# Patient Record
Sex: FEMALE | Race: WHITE | NOT HISPANIC OR LATINO | Employment: FULL TIME | URBAN - METROPOLITAN AREA
[De-identification: names, ages, dates, MRNs, and addresses within clinical notes are randomized per-mention and may not be internally consistent; named-entity substitution may affect disease eponyms.]

---

## 2023-03-24 ENCOUNTER — TRANSFERRED RECORDS (OUTPATIENT)
Dept: HEALTH INFORMATION MANAGEMENT | Facility: CLINIC | Age: 33
End: 2023-03-24

## 2023-06-27 ENCOUNTER — TRANSCRIBE ORDERS (OUTPATIENT)
Dept: MATERNAL FETAL MEDICINE | Facility: CLINIC | Age: 33
End: 2023-06-27

## 2023-06-27 ENCOUNTER — TELEPHONE (OUTPATIENT)
Dept: MATERNAL FETAL MEDICINE | Facility: CLINIC | Age: 33
End: 2023-06-27

## 2023-06-27 ENCOUNTER — MEDICAL CORRESPONDENCE (OUTPATIENT)
Dept: HEALTH INFORMATION MANAGEMENT | Facility: CLINIC | Age: 33
End: 2023-06-27
Payer: COMMERCIAL

## 2023-06-27 DIAGNOSIS — O26.90 PREGNANCY RELATED CONDITION, ANTEPARTUM: Primary | ICD-10-CM

## 2023-06-27 NOTE — TELEPHONE ENCOUNTER
June 27, 2023      I called Obdulia to obtain additional information regarding her provider's referral to Maternal Fetal Medicine for preconception genetic counseling.    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 They determined at that time that it was paternally inherited. Her older sister sought genetic counseling prior to have children, which identified she too was a carrier of the balanced translocation.     I asked Obdulia if she is able to obtain the early records from her amniocentesis, granted they may be difficult to track down at this point. She said she will ask her mother, and if she does find them, email them to me. Either way, I asked that she bring a copy of her sister's genetic test report if possible.    We look forward to being part of Obdulia's care.      Mauricio Aquino GC, MS  [facility]  Maternal Fetal Medicine  Office: [phone]  [facility]: [phone]   Fax: [phone]  [facility]

## 2023-06-28 ENCOUNTER — TRANSCRIBE ORDERS (OUTPATIENT)
Dept: MATERNAL FETAL MEDICINE | Facility: CLINIC | Age: 33
End: 2023-06-28

## 2023-06-28 DIAGNOSIS — Z31.69 ENCOUNTER FOR PRECONCEPTION CONSULTATION: Primary | ICD-10-CM

## 2023-07-03 ENCOUNTER — DOCUMENTATION ONLY (OUTPATIENT)
Dept: MATERNAL FETAL MEDICINE | Facility: CLINIC | Age: 33
End: 2023-07-03
Payer: COMMERCIAL

## 2023-07-03 ENCOUNTER — TELEPHONE (OUTPATIENT)
Dept: MATERNAL FETAL MEDICINE | Facility: CLINIC | Age: 33
End: 2023-07-03
Payer: COMMERCIAL

## 2023-07-03 NOTE — TELEPHONE ENCOUNTER
July 3rd, 2023     Called Obdulia and left a voicemail asking for a return call about her upcoming appointment with a maternal fetal medicine genetic counselor scheduled for 7/6/2023.     JOSE NICOLE GC, MS  Genetic Counselor Intern  [facility]  Maternal Fetal Medicine  Office: [phone]   [facility]: [phone]   Fax: [phone]  [facility]

## 2023-07-03 NOTE — PROGRESS NOTES
7/3/2023    Called referring OB clinic to discuss Obdulia's referral to [facility] for preconception genetic counseling.  Their triage nurse reports that they have not received any documentation of genetic testing results for Obdulia, but that she did provide them copies of genetic testing results done on her sister's pregnancy.  Genetic counseling declined to review these records due to privacy concerns for Obdulia's sister and will contact Obdulia directly to discuss further.     Az Davalos MS, [facility]  Licensed Genetic Counselor  Phone: [phone]  Pager: [phone]

## 2023-07-06 ENCOUNTER — MEDICAL CORRESPONDENCE (OUTPATIENT)
Dept: MATERNAL FETAL MEDICINE | Facility: CLINIC | Age: 33
End: 2023-07-06

## 2023-07-06 ENCOUNTER — OFFICE VISIT (OUTPATIENT)
Dept: MATERNAL FETAL MEDICINE | Facility: CLINIC | Age: 33
End: 2023-07-06
Attending: OBSTETRICS & GYNECOLOGY
Payer: COMMERCIAL

## 2023-07-06 ENCOUNTER — LAB (OUTPATIENT)
Dept: LAB | Facility: CLINIC | Age: 33
End: 2023-07-06
Attending: OBSTETRICS & GYNECOLOGY
Payer: COMMERCIAL

## 2023-07-06 DIAGNOSIS — Z31.69 ENCOUNTER FOR PRECONCEPTION CONSULTATION: ICD-10-CM

## 2023-07-06 DIAGNOSIS — Z31.438 ENCOUNTER FOR OTHER GENETIC TESTING OF FEMALE FOR PROCREATIVE MANAGEMENT: ICD-10-CM

## 2023-07-06 DIAGNOSIS — Z82.79 FAMILY HISTORY OF AUTOSOMAL TRANSLOCATION: Primary | ICD-10-CM

## 2023-07-06 DIAGNOSIS — Z82.79 FAMILY HISTORY OF AUTOSOMAL TRANSLOCATION: ICD-10-CM

## 2023-07-06 PROCEDURE — 88291 CYTO/MOLECULAR REPORT: CPT | Performed by: MEDICAL GENETICS

## 2023-07-06 PROCEDURE — 88264 CHROMOSOME ANALYSIS 20-25: CPT

## 2023-07-06 PROCEDURE — 36415 COLL VENOUS BLD VENIPUNCTURE: CPT

## 2023-07-06 PROCEDURE — 96040 HC GENETIC COUNSELING, EACH 30 MINUTES: CPT | Performed by: GENETIC COUNSELOR, MS

## 2023-07-06 NOTE — PROGRESS NOTES
[facility] [place]  Genetic Counseling Consult    Patient:  Obdulia Miguel YOB: 1990   Date of Service:  23   MRN: [id number]    Obdulia was seen at the [facility] for genetic consultation. The indication for genetic counseling is personal and family history of balanced chromosome translocation. The patient was unaccompanied to this visit.     The session was conducted in English.      IMPRESSION/ PLAN   1. Obdulia was identified as having a balanced 2;7 translocation via amniocentesis done during her mother's pregnancy with her.  Unfortunately records/results of this testing are no longer available.  Obdulia had blood drawn for chromosome analysis to confirm this status.  Results are expected in 2-3 weeks and will be available in Epic. Obdulia will be contacted to discuss results and requests a detailed message be left in her voicemail if she cannot be reached.    2. Obdulia also had blood drawn for carrier screening (Voxeet custom core panel, CF, SMA, FMR1, HBB and HBA).  Results are expected in 2 weeks and will be available in Epic and communicated to the patient. Obdulia completed a consent to communicate with her partner Darryl in the event carrier screening is needed for him.          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  This diagnosis was made via amniocentesis when her mother was pregnant with her.  Odbulia has a significant family history of the translocation as well. Unfortunately, records or results from her own diagnostic testing are no longer available.  Obdulia reports that diagnostic test results from her sister's own pregnancies are available for review, however we discussed that it would be better for her to have her own  diagnosis confirmed via chromosome analysis for herself. Obdulia completed consent for this testing today and will be contacted with results when available.    The majority of today's appointment was spent discussing Obdulia's personal and family history of a balanced chromosome translocation, reportedly involving chromosomes 2 and 7. Chromosomes are the packages of genetic information that we inherit from our parents, with each parent contributing 23 chromosomes for a total of 46.  It is possible for chromosomes to exchange or rearrange parts of themselves, resulting in what is called a translocation.  When an individual has a translocation that retains all of the necessary pieces of DNA, just in different locations, this is called a balanced translocation. For example, if an individual has a balanced translocation between chromosomes 2 and 7, they have 1 normal copy of chromosome 2, 1 normal copy of chromosome 7, a copy of 2 that has swapped pieces with chromosome 7, and the chromosome 7 that has that piece of 2. Balanced translocations typically do not cause any detectable health problems in the individual, but can be a cause for difficult reproductive outcomes such as recurrent miscarriage.     When an individual's body produces eggs or sperm, it splits the number of chromosome present in a cell in half, so that they only passes on half of their genetic information to a pregnancy, with the other half coming from the egg/sperm produced by their partner.  During this process, called segregation, there are number of possible outcomes for individuals with a balanced translocation:     1.  An egg/sperm receives the normal copy of each chromosome, and there are no complications expected.        2.  An egg/sperm receives both chromosomes involved in the translocation.  This egg/sperm would have the expected amount of chromosome material, just rearranged.  A pregnancy conceived in this scenario would be a balanced  translocation carrier, the same as the parent.       3.  An egg receives an unbalanced amount of chromosome material, receiving the normal copy of  one chromosome pair, and a translocation copy.  For our example an individual could receive the normal copy of chromosome 2, and the copy of chromosome 7 that is missing part of 7 and has a piece of chromosome 2 attached to it.  This results in a net loss in part of chromosome 7 and a net gain in part of chromosome 2.  Other unbalanced segregations are possible as well.       Extra or missing pieces of DNA are associated with a wide range of outcomes, including miscarriage, still birth, birth defects, and cognitive impairment.  The specific extra or missing portions of DNA play a role in determining the severity of outcome.  In general, the larger the amount of DNA involved, the more severe the outcome (the larger the loss or gain of DNA, the more likely a pregnancy is to miscarry instead of carrying to term and having health concerns after delivery).  The chances for each configuration of chromosomes in an egg depends on the specific rearrangements present. Obdulia's family history is negative for individuals with birth defects or intellectual disabilities, but multiple individuals with the translocation or their partners have experienced multiple miscarriages.    We discussed that there is a physician who specializes in working with families who have known balanced translocations that provides specific risk estimates for miscarriage and live born pregnancies with an unbalanced chromosome complement for these couples based on their specific translocation. Obdulia would like this analysis done once her chromosome analysis results are back.     Obdulia is considering beginning trying for pregnancy with her , and we discussed that based on her reported translocation status her pregnancies may be at higher risk for miscarriage, birth defects, and developmental  "impairments/disabiliites.  We discussed that other options explored by some individuals with translocations include IVF pregnancies with preimplantation genetic screening, using sperm/egg donors, or adoption.  In any pregnancy diagnostic testing via CVS would e available as early as 11 weeks gestation, and we discussed that in certain circumstances there is also non-invasive prenatal testing for large chromosome changes outside the typical scope of NIPT.  Early genetic counseling in any future pregnancy could be beneficial for Obdulia to ensure an appropriate testing strategy based on her preferences.     FAMILY HISTORY   A three-generation pedigree was obtained today and is scanned under the \"Media\" tab in Epic. The family history was reported by Obdulia.    The following significant findings were reported today:     Obdulia, her sister, her father, a paternal uncle, and a paternal first cousin are all reportedly carriers of the same balanced translocation.  See discussion above under medical history.    Obdulia's father had colorectal cancer at age 52.  We discussed that he may be a candidate for genetic testing for hereditary cancer risks, and that a genetic counseling appointment focused on his history may be beneficial for him and the family.  Regardless the family history should be taken into account when planning cancer screening for Obdulia and her sister.     Otherwise, the reported family history is unremarkable for multiple miscarriages, stillbirths, birth defects, intellectual disabilities, known genetic conditions, and consanguinity.       CARRIER SCREENING   Expanded carrier screening is available to screen for autosomal recessive conditions and X-linked conditions in a large list of genes. Autosomal recessive conditions happen when a mutation has been inherited from the egg and sperm and include conditions like cystic fibrosis, thalassemia, hearing loss, spinal muscular atrophy, and more. X-linked " conditions happen when a mutation has been inherited from the egg and include conditions like fragile X syndrome.  screening was also reviewed. About [place] [facility]      We discussed carrier screening can be done before or during apregnancy. The purpose of carrier screening is providing an individual or couple with a personalized risk assessment for genetic conditions. This is accomplished by screening an individual to determine if they are a carrierfor a genetic condition. For most conditions, both parents must be a carrier of the condition for the pregnancy to be at an increased risk. For other conditions that are X-linked, there is an increased risk when a female(mother) is a carrier and the concerns are greater if she passes that condition to a son.     Carrier screening is an option and a personal decision to pursue. If an individual or couple is interested or wishes to pursue carrier screening the following is discussed:    For most genetic conditions, carriers are healthy individuals. For autosomal recessive conditions (ex cystic fibrosis, sicklecell anemia, etc), individuals have two copies of each gene. Carrier individuals have a mutation in one copy. For X-linked conditions, females have two copies of each gene and are considered carriers if one copy has amutation. Males only have one copy of an X-linked gene, therefore, if that one copy has a mutation they are affected by the condition, rather than only being a carrier      For select conditions, carriers can have symptoms, however, the chance is variable. Examples of these conditions include:  o Femalepremutation carriers of fragile X syndrome can have symptoms in adulthood including premature ovarian failure and ataxia. If a female passes the mutation to a son they are at a high risk for fragile X syndrome, which is themost common genetic cause for autism spectrum disorder  o Female or male carriers of Gaucher disease can have an increased  chance for developing Parkinson's disease. Gaucher disease is a severe metabolic disorder.       If both parents are carriers of an autosomal recessive condition, there are three possible outcomes for each pregnancy/child: 25% unaffected, 50% carrier, and 25%affected. The only method to determine the outcome or diagnosis the condition in a pregnancy is an invasive testing option such as an amniocentesis. Some genetic conditions will have ultrasound findings during the pregnancybut many do not. Some couples will choose the diagnostic testing to plan for delivery or choose termination of an affected pregnancy. Other couples will choose to test for the condition after delivery. While these conditionscannot be cured or treated during the pregnancy it may guide management recommendations (ultrasounds) for pregnancy as well as delivery and infant care.     Obdulia wished to pursue carrier screening. The following was discussed as part of informed consent in addition to the above information:    We discussed [employer] carrier screening which is offered with several different panels. Obdulia chose the CORE panel which includes CF, SMA, FMR1, HBB, and HBA.       Carrier screening is not meant to diagnose the patient with a condition, and generally carriers are asymptomatic. However, certain genes may confer increased risks for various health concerns in carriers such as fragile X syndrome, Duchene muscular dystrophy, and Gaucher disease among others.       We discussed that expanded carrier screening is designed to identify carrier status for conditions that are primarily childhood or adolescent onset. Expanded carrier screening does not evaluate for adult-onset conditions such as hereditary cancer syndromes, dementia/ Alzheimer's disease, or cardiovascular disease risk factors. Additionally, expanded carrier screening is not comprehensive for all known genetic diseases or inherited conditions. This is a screening test, and  residual carrier status risk figures will be provided to the patient after results become available.  We discussed there are limitations such as current technology and rare chance of a false positive or negative.       [email or web address] laboratory will report on pseudodeficiency alleles, which are benign variants that are not known to be associated with disease and are not thought to impact the individuals risk to be a carrier for these conditions. However, the presence of pseudodeficiency alleles can exhibit false positive results on biochemical. Therefore, disclosing this information to patients may aid in interpretation of a  screen flag.       Results are typically available in 10-21 days. We will call her with the results and the report will eventually be available via [email or web address]'s patient portal.       Recommendations will be made for partner testing, if the patient is found to be a carrier for any autosomal recessive conditions. MFM will facilitate in screening for the partner.       There are implications for family members. If an individual is a carrier of a condition there is a chance for relatives to also be a carrier. This may be helpful information to disclose to family (siblings, cousins) so they may choose if they want to pursue carriers screening. In addition, if only one parent is found to be a carrier, there is a 50% chance for each child to be a carrier. This may be helpful information for the patient's children when they start a family.      [email or web address] will contact the patient via text, email, or both with cost estimate information. The communication will list the cost billed through insurance and provide an option for self-pay. The default is insurance billing so patients must choose the self pay option and follow through with credit card information if desired. The self pay cost of NIPT is $99, carrier screening is $250 with partner carrier screening for $100. The patient has the responsibility to  determine if insurance or self pay is a better financial decision.           GENETIC TESTING OPTIONS   Genetic testing during a pregnancy includes screening and diagnostic procedures.      Screening tests are non-invasive which means no risk to the pregnancy and includes ultrasounds and blood work. The benefits and limitations of screening were reviewed. Screening tests provide a risk assessment (chance) specific to the pregnancy for certain fetal chromosome abnormalities but cannot definitively diagnose or exclude a fetal chromosome abnormality. Follow-up genetic counseling and consideration of diagnostic testing is recommended with any abnormal screening result. Diagnostic testing during a pregnancy is more certain and can test for more conditions. However, the tests do have a risk of miscarriage that requires careful consideration. These tests can detect fetal chromosome abnormalities with greater than 99% certainty. Results can be compromised by maternal cell contamination or mosaicism and are limited by the resolution of current genetic testing technology.     There is no screening or diagnostic test that detects all forms of birth defects or intellectual disability.     We discussed the following screening options:   Non-invasive prenatal testing (NIPT)    Also called cell-free DNA screening because it detects chromosomes from the placenta in the pregnant person's blood    Can be done any time after 10 weeks gestation    Screens for trisomy 21, trisomy 18, trisomy 13, and sex chromosome aneuploidies    Cannot screen for open neural tube defects, maternal serum AFP after 15 weeks is recommended    We discussed the following diagnostic options:   Chorionic villus sampling (CVS)    Invasive diagnostic procedure done between 10w0d and 13w6d    The procedure collects a small sample from the placenta for the purpose of chromosomal testing and/or other genetic testing    Diagnostic result; more than 99% sensitivity  for fetal chromosome abnormalities    Cannot screen for open neural tube defects, maternal serum AFP after 15 weeks is recommended    Amniocentesis    Invasive diagnostic procedure done after 15 weeks gestation    The procedure collects a small sample of amniotic fluid for the purpose of chromosomal testing and/or other genetic testing    Diagnostic result; more than 99% sensitivity for fetal chromosome abnormalities    Testing for AFP in the amniotic fluid can test for open neural tube defects        It was a pleasure to be involved with Obdulia s care. Face-to-face time of the meeting was 60 minutes.    Az Davalos GC, MS, [facility]  Certified and [place] Licensed Genetic Counselor  [facility]  Maternal Fetal Medicine  Office: [phone]  [facility]: [phone]   Fax: [phone]  [facility]

## 2023-07-17 ENCOUNTER — TELEPHONE (OUTPATIENT)
Dept: MATERNAL FETAL MEDICINE | Facility: CLINIC | Age: 33
End: 2023-07-17
Payer: COMMERCIAL

## 2023-07-17 LAB — SCANNED LAB RESULT: NORMAL

## 2023-07-17 NOTE — TELEPHONE ENCOUNTER
7/17/2023    Called Obdulia to discuss carrier screening results.  Her results are negative for all 6 genes assessed.  Reproductive risks are low for all conditions.  Obdulia had no questions regarding this result and confirmed that her chromosome analysis is still pending, with results expected in the next 1-2 weeks.      Az Davalos MS, [facility]  Licensed Genetic Counselor  Phone: [phone]  Pager: [phone]

## 2023-07-21 LAB
CULTURE HARVEST COMPLETE DATE: NORMAL
INTERPRETATION: NORMAL
ISCN: NORMAL
METHODS: NORMAL

## 2023-07-24 ENCOUNTER — TELEPHONE (OUTPATIENT)
Dept: MATERNAL FETAL MEDICINE | Facility: CLINIC | Age: 33
End: 2023-07-24
Payer: COMMERCIAL

## 2023-07-24 NOTE — TELEPHONE ENCOUNTER
7/24/2023    Called .Obdulia to discuss results of her chromosome analysis.  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  Previously we had discussed submitting Obdulia's results to a physician in [place] that specializes in pregnancy risk assessments for parents with balanced reciprocal translocations and this request will be submitted now that Obdulia's results are back.  Genetic counseling will contact Obdulia to discuss this additional information as soon as it is available. This information was left in Obdulia's voicemail as requested at her genetic counseling appointment and she was encouraged to contact me to discuss further.       Az Davalos MS, [facility]  Licensed Genetic Counselor  Phone: [phone]  Pager: [phone]

## 2023-08-16 ENCOUNTER — TELEPHONE (OUTPATIENT)
Dept: MATERNAL FETAL MEDICINE | Facility: CLINIC | Age: 33
End: 2023-08-16
Payer: COMMERCIAL

## 2023-08-16 NOTE — TELEPHONE ENCOUNTER
8/16/2023    Called Obdulia to follow up on her previously disclosed balanced chromosome translocation results.  Further analysis using the protocol published by Dr. Banda al (citation below) provided the following risk assessments based on her specific translocation:    Risk for miscarriage: 37% (This estimate includes a baseline risk of miscarriage of 12% for pregnancy for a woman under 35)  Risk for liveborn pregnancy with unbalanced chromosomes: 1-4%    We discussed that a liveborn pregnancy with unbalanced chromosomes would be expected to have some physical and/or developmental disabilities, but predicting the type and severity is not possible with current information. Obdulia demonstrated a strong understanding of this new information and had no questions regarding this information.  She reports that these risks numbers are similar to what her sister remembers learning from her genetic counselor as well, and she feels some comfort in knowing the most likely outcome from any conception would be a liveborn pregnancy with balanced chromosomes.      Obdulia reports that she is newly pregnant based on a positive home pregnancy test, and is scheduled for a new OB intake visit and viability check on September 8th.  We discussed that non-invasive prenatal screening for large chromosome copy number changes such as unbalanced translocations is available at ~10 weeks gestation with a test called MaterniTGenome.  This is different screening than the standard NIPT offered by most OBs for common aneuploidy such as Down syndrome.  We discussed that some primary OB providers would be comfortable/able to facilitate this themselves, but referral to genetic counseling to discuss and coordinate testing is always available if necessary. Obdulia plans to discuss with her OB provider and will pursue a referral to M if needed.  We discussed that NIPT, including the maternitgenome testing is non-diagnostic, and briefly reviewed  the options for CVS and amniocentesis as well.  All of Obdulia's questions were answered to her satisfaction and she was encouraged to contact me directly as needed moving forward.     INOCENTE Mcmillan., [place]ell, JASE MCCANN., & LEE Ardon. (2022). Reproductive risk estimation calculator for balanced translocation carriers. Current Protocols, 2, e633. doi: 10.1002/cpz1.633    Az Davalos MS, [facility]  Licensed Genetic Counselor  Phone: [phone]  Pager: [phone]

## 2023-08-26 ENCOUNTER — HEALTH MAINTENANCE LETTER (OUTPATIENT)
Age: 33
End: 2023-08-26

## 2023-09-08 ENCOUNTER — LAB REQUISITION (OUTPATIENT)
Dept: LAB | Facility: CLINIC | Age: 33
End: 2023-09-08
Payer: COMMERCIAL

## 2023-09-08 ENCOUNTER — TRANSCRIBE ORDERS (OUTPATIENT)
Dept: MATERNAL FETAL MEDICINE | Facility: CLINIC | Age: 33
End: 2023-09-08

## 2023-09-08 ENCOUNTER — TRANSFERRED RECORDS (OUTPATIENT)
Dept: HEALTH INFORMATION MANAGEMENT | Facility: CLINIC | Age: 33
End: 2023-09-08

## 2023-09-08 ENCOUNTER — LAB REQUISITION (OUTPATIENT)
Dept: LAB | Facility: CLINIC | Age: 33
End: 2023-09-08

## 2023-09-08 DIAGNOSIS — Z34.90 ENCOUNTER FOR SUPERVISION OF NORMAL PREGNANCY, UNSPECIFIED, UNSPECIFIED TRIMESTER: ICD-10-CM

## 2023-09-08 DIAGNOSIS — O26.90 PREGNANCY RELATED CONDITION, ANTEPARTUM: Primary | ICD-10-CM

## 2023-09-08 LAB
BASOPHILS # BLD AUTO: 0 10E3/UL (ref 0–0.2)
BASOPHILS NFR BLD AUTO: 0 %
EOSINOPHIL # BLD AUTO: 0 10E3/UL (ref 0–0.7)
EOSINOPHIL NFR BLD AUTO: 0 %
ERYTHROCYTE [DISTWIDTH] IN BLOOD BY AUTOMATED COUNT: 12.1 % (ref 10–15)
HBV SURFACE AG SERPL QL IA: NONREACTIVE
HCT VFR BLD AUTO: 40.4 % (ref 35–47)
HCV AB SERPL QL IA: NONREACTIVE
HGB BLD-MCNC: 13.8 G/DL (ref 11.7–15.7)
HOLD SPECIMEN: NORMAL
IMM GRANULOCYTES # BLD: 0 10E3/UL
IMM GRANULOCYTES NFR BLD: 0 %
LYMPHOCYTES # BLD AUTO: 1.6 10E3/UL (ref 0.8–5.3)
LYMPHOCYTES NFR BLD AUTO: 15 %
MCH RBC QN AUTO: 29.3 PG (ref 26.5–33)
MCHC RBC AUTO-ENTMCNC: 34.2 G/DL (ref 31.5–36.5)
MCV RBC AUTO: 86 FL (ref 78–100)
MONOCYTES # BLD AUTO: 0.7 10E3/UL (ref 0–1.3)
MONOCYTES NFR BLD AUTO: 6 %
NEUTROPHILS # BLD AUTO: 8.7 10E3/UL (ref 1.6–8.3)
NEUTROPHILS NFR BLD AUTO: 79 %
NRBC # BLD AUTO: 0 10E3/UL
NRBC BLD AUTO-RTO: 0 /100
PLATELET # BLD AUTO: 286 10E3/UL (ref 150–450)
RBC # BLD AUTO: 4.71 10E6/UL (ref 3.8–5.2)
WBC # BLD AUTO: 11.1 10E3/UL (ref 4–11)

## 2023-09-08 PROCEDURE — 86803 HEPATITIS C AB TEST: CPT | Performed by: OBSTETRICS & GYNECOLOGY

## 2023-09-08 PROCEDURE — 86592 SYPHILIS TEST NON-TREP QUAL: CPT | Performed by: OBSTETRICS & GYNECOLOGY

## 2023-09-08 PROCEDURE — 87591 N.GONORRHOEAE DNA AMP PROB: CPT | Performed by: OBSTETRICS & GYNECOLOGY

## 2023-09-08 PROCEDURE — 86762 RUBELLA ANTIBODY: CPT | Performed by: OBSTETRICS & GYNECOLOGY

## 2023-09-08 PROCEDURE — 87340 HEPATITIS B SURFACE AG IA: CPT | Performed by: OBSTETRICS & GYNECOLOGY

## 2023-09-08 PROCEDURE — 87389 HIV-1 AG W/HIV-1&-2 AB AG IA: CPT | Performed by: OBSTETRICS & GYNECOLOGY

## 2023-09-08 PROCEDURE — 87491 CHLMYD TRACH DNA AMP PROBE: CPT | Performed by: OBSTETRICS & GYNECOLOGY

## 2023-09-08 PROCEDURE — 85025 COMPLETE CBC W/AUTO DIFF WBC: CPT | Performed by: OBSTETRICS & GYNECOLOGY

## 2023-09-08 PROCEDURE — 87086 URINE CULTURE/COLONY COUNT: CPT | Performed by: OBSTETRICS & GYNECOLOGY

## 2023-09-09 LAB
C TRACH DNA SPEC QL PROBE+SIG AMP: NEGATIVE
HIV 1+2 AB+HIV1 P24 AG SERPL QL IA: NONREACTIVE
N GONORRHOEA DNA SPEC QL NAA+PROBE: NEGATIVE

## 2023-09-10 LAB — BACTERIA UR CULT: NORMAL

## 2023-09-11 LAB
RPR SER QL: NONREACTIVE
RUBV IGG SERPL QL IA: 2.57 INDEX
RUBV IGG SERPL QL IA: POSITIVE

## 2023-09-25 ENCOUNTER — OFFICE VISIT (OUTPATIENT)
Dept: MATERNAL FETAL MEDICINE | Facility: CLINIC | Age: 33
End: 2023-09-25
Attending: OBSTETRICS & GYNECOLOGY
Payer: COMMERCIAL

## 2023-09-25 ENCOUNTER — LAB (OUTPATIENT)
Dept: LAB | Facility: CLINIC | Age: 33
End: 2023-09-25
Attending: OBSTETRICS & GYNECOLOGY
Payer: COMMERCIAL

## 2023-09-25 ENCOUNTER — MEDICAL CORRESPONDENCE (OUTPATIENT)
Dept: HEALTH INFORMATION MANAGEMENT | Facility: CLINIC | Age: 33
End: 2023-09-25

## 2023-09-25 ENCOUNTER — TRANSFERRED RECORDS (OUTPATIENT)
Dept: HEALTH INFORMATION MANAGEMENT | Facility: CLINIC | Age: 33
End: 2023-09-25

## 2023-09-25 DIAGNOSIS — Q99.8 CHROMOSOMAL TRANSLOCATION: Primary | ICD-10-CM

## 2023-09-25 DIAGNOSIS — Z31.5 ENCOUNTER FOR PROCREATIVE GENETIC COUNSELING AND TESTING: ICD-10-CM

## 2023-09-25 DIAGNOSIS — Z36.9 UNSPECIFIED ANTENATAL SCREENING: ICD-10-CM

## 2023-09-25 DIAGNOSIS — O26.90 PREGNANCY RELATED CONDITION, ANTEPARTUM: ICD-10-CM

## 2023-09-25 DIAGNOSIS — Q99.8 CHROMOSOMAL TRANSLOCATION: ICD-10-CM

## 2023-09-25 PROCEDURE — 36415 COLL VENOUS BLD VENIPUNCTURE: CPT

## 2023-09-25 PROCEDURE — 96040 HC GENETIC COUNSELING, EACH 30 MINUTES: CPT | Performed by: GENETIC COUNSELOR, MS

## 2023-09-25 NOTE — PROGRESS NOTES
[facility]aternal [facility]etal [facility]  Genetic Counseling Consult    Patient:  Obdulia Crum YOB: 1990   Date of Service:  23   MRN: [id number]    Obdulia was seen at the [facility] [place] for genetic consultation. The indication for genetic counseling is desire to discuss options for genetic screening and diagnostics. The patient was unaccompanied to this visit.     The session was conducted in English.      IMPRESSION/ PLAN   1. Obdulia had NIPT drawn for routine aneuploidy screening through her primary OB provider last week.  After reviewing the options for NIPT for other chromosome imbalances such as the unbalanced rearrangements from her translocation, Obdulia opted to proceed with additional NIPT today.  Obdulia had blood drawn for MaterniTGenome through [employer].  Results are expected in 7-14 days and will be available in [employer].  Obdulia requests detailed results, excluding predicted fetal sex, be left in her voicemail if she cannot be reached, and understands results including predicted fetal sex will be available in [employer] as well.     2. Further recommendations include a level II ultrasound with MFM and maternal serum AFP only screening for neural tube defects, if desired (quad screen should NOT be performed).     3. 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  This was briefly reviewed today and Obdulia reports that she would decline CVS, but might consider amniocentesis in a continuing pregnancy.  She likely would not make a final decision regarding amnio until after a level II anatomy scan with MFM.      PREGNANCY HISTORY   /Parity: G1      CURRENT PREGNANCY   Current Age: 32 year old   Age at Delivery: 32 year old  EUGENIA: Not found.                                   Gestational Age: Unknown  This pregnancy is a single  gestation.   This pregnancy was conceived spontaneously.    MEDICAL HISTORY   Obdulia s reported medical history is not expected to impact pregnancy management or risks to fetal development.       FAMILY HISTORY   A three-generation pedigree was obtained previously by a genetic counselor on 7/6/23 please see corresponding documentation for details.       CARRIER SCREENING     Obdulia had carrier screening done as a part of her preconception genetic counseling care earlier this year and this topic was not reviewed today.        RISK ASSESSMENT FOR CHROMOSOME CONDITIONS   We explained that the risk for fetal chromosome abnormalities increases with maternal age. We discussed specific features of common chromosome abnormalities, including Down syndrome, trisomy 13, trisomy 18, and sex chromosome trisomies.    At age 33 at midtrimester, the risk to have a baby with Down syndrome is 1 in 508.   At age 33 at midtrimester, the risk to have a baby with any chromosome abnormality is 1 in 254.     Obdulia has NIPT for common aneuploidies in process through her primary OB and after out discussion of her 2;7 translocation today opted to proceed with materniT genome NIPT.       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 Banda al (citation below) provided the following risk assessments based on her specific translocation:     Risk for miscarriage: 37% (This estimate includes a baseline risk of miscarriage of 12% for pregnancy for a woman under 35)  Risk for liveborn pregnancy with unbalanced chromosomes: 1-4%    BETHEL Mcmillan, Mendell, N. R., & Duglas, S. L. P. (2022). Reproductive risk estimation calculator for balanced translocation carriers. Current Protocols, 2, e633. doi: 10.1002/cpz1.633       We discussed the benefits and limitations of genome-wide cfDNA via MaterniT Genome via [employer]. This screen analyzes  cell free fetal DNA for chromosome imbalances greater than 7 Mb. It will screen for whole chromosome imbalances (trisomies and monosomies) as well as duplications and deletions in this range. Further, it will screen for seven smaller, clinically relevant deletion syndrome (22q11.2, 15q11, 11q23, 8q24, 5p15, 4p16, and 1p36). [employer] reports an estimated genome-wide sensitivity of up to 96%, though fetal fraction and event size can significantly impact the sensitivity. Negative screening reduces but does not eliminate the risk for the conditions screened and in no way replaces fetal microarray. Following a positive screen, evaluation by ultrasound and diagnostic testing is recommended. After reviewing the benefits and limitations of MaterniTGenome, as well as the expected insurance/cost concerns with performing NIPT twice in one pregnancy, Obdulia opted to proceed with screening today.    GENETIC TESTING OPTIONS   Genetic testing during a pregnancy includes screening and diagnostic procedures.      Screening tests are non-invasive which means no risk to the pregnancy and includes ultrasounds and blood work. The benefits and limitations of screening were reviewed. Screening tests provide a risk assessment (chance) specific to the pregnancy for certain fetal chromosome abnormalities but cannot definitively diagnose or exclude a fetal chromosome abnormality. Follow-up genetic counseling and consideration of diagnostic testing is recommended with any abnormal screening result. Diagnostic testing during a pregnancy is more certain and can test for more conditions. However, the tests do have a risk of miscarriage that requires careful consideration. These tests can detect fetal chromosome abnormalities with greater than 99% certainty. Results can be compromised by maternal cell contamination or mosaicism and are limited by the resolution of current genetic testing technology.     There is no screening or diagnostic test  that detects all forms of birth defects or intellectual disability.     We discussed the following screening options:   Non-invasive prenatal testing (NIPT)  Also called cell-free DNA screening because it detects chromosomes from the placenta in the pregnant person's blood  Can be done any time after 10 weeks gestation  Screens for trisomy 21, trisomy 18, trisomy 13, and sex chromosome aneuploidies  Cannot screen for open neural tube defects, maternal serum AFP after 15 weeks is recommended    We discussed the following ultrasound options:  Comprehensive level II ultrasound (Fetal Anatomy Ultrasound)  Ultrasound done between 18-20 weeks gestation  Screens for major birth defects and markers for aneuploidy (like trisomy 21 and trisomy 18)  Includes looking at the fetus/baby's growth, heart, organs (stomach, kidneys), placenta, and amniotic fluid    We discussed the following diagnostic options:   Chorionic villus sampling (CVS)  Invasive diagnostic procedure done between 10w0d and 13w6d  The procedure collects a small sample from the placenta for the purpose of chromosomal testing and/or other genetic testing  Diagnostic result; more than 99% sensitivity for fetal chromosome abnormalities  Cannot screen for open neural tube defects, maternal serum AFP after 15 weeks is recommended  Amniocentesis  Invasive diagnostic procedure done after 15 weeks gestation  The procedure collects a small sample of amniotic fluid for the purpose of chromosomal testing and/or other genetic testing  Diagnostic result; more than 99% sensitivity for fetal chromosome abnormalities  Testing for AFP in the amniotic fluid can test for open neural tube defects      It was a pleasure to be involved with [facility]. Face-to-face time of the meeting was 30 minutes.    Az Davalos, GC, MS, [facility]  Board Certified and [place] Licensed Genetic Counselor   [facility]  Maternal Fetal Medicine  Office: [phone]  MFM:  [phone]   Fax: [phone]  [facility]

## 2023-10-03 ENCOUNTER — TELEPHONE (OUTPATIENT)
Dept: MATERNAL FETAL MEDICINE | Facility: CLINIC | Age: 33
End: 2023-10-03
Payer: COMMERCIAL

## 2023-10-03 LAB — SCANNED LAB RESULT: NORMAL

## 2023-10-03 NOTE — TELEPHONE ENCOUNTER
10/3/2023       Called Obdulia to discuss NIPT results.  Results came back negative for chromosome abnormalities in chromosomes 21, 18, & 13, as well as the sex chromosomes. Genome-wide large copy number aneuploidy screening is also negative  These test results do not definitively rule out the possibility of one of these conditions, but they do greatly reduce the likelihood for the pregnancy to have a common trisomy or the unbalance translocation outcomes involving chromosomes 2 and 7 possible for this pregnancy.   Test results included predicted fetal sex and this information is available if desired at a later time. This information was left in Obdulia's voicemmail as requested and Obdulia was encouraged to reach out if she has any questions or concerns in the future.       Az Davalos MS, [facility]  Licensed Genetic Counselor  Phone: [phone]  Pager: [phone]

## 2023-10-16 ENCOUNTER — LAB REQUISITION (OUTPATIENT)
Dept: LAB | Facility: CLINIC | Age: 33
End: 2023-10-16
Payer: COMMERCIAL

## 2023-10-16 ENCOUNTER — TRANSFERRED RECORDS (OUTPATIENT)
Dept: HEALTH INFORMATION MANAGEMENT | Facility: CLINIC | Age: 33
End: 2023-10-16

## 2023-10-16 ENCOUNTER — TRANSCRIBE ORDERS (OUTPATIENT)
Dept: MATERNAL FETAL MEDICINE | Facility: CLINIC | Age: 33
End: 2023-10-16
Payer: COMMERCIAL

## 2023-10-16 ENCOUNTER — MEDICAL CORRESPONDENCE (OUTPATIENT)
Dept: HEALTH INFORMATION MANAGEMENT | Facility: CLINIC | Age: 33
End: 2023-10-16

## 2023-10-16 DIAGNOSIS — Z3A.14 14 WEEKS GESTATION OF PREGNANCY: ICD-10-CM

## 2023-10-16 DIAGNOSIS — O26.90 PREGNANCY RELATED CONDITION, ANTEPARTUM: Primary | ICD-10-CM

## 2023-10-16 LAB
ABO/RH(D): NORMAL
ANTIBODY SCREEN: NEGATIVE
SPECIMEN EXPIRATION DATE: NORMAL
SPECIMEN EXPIRATION DATE: NORMAL

## 2023-10-16 PROCEDURE — 86901 BLOOD TYPING SEROLOGIC RH(D): CPT | Performed by: OBSTETRICS & GYNECOLOGY

## 2023-10-16 PROCEDURE — 87086 URINE CULTURE/COLONY COUNT: CPT | Performed by: OBSTETRICS & GYNECOLOGY

## 2023-10-16 PROCEDURE — 86850 RBC ANTIBODY SCREEN: CPT | Performed by: OBSTETRICS & GYNECOLOGY

## 2023-10-18 LAB — BACTERIA UR CULT: NORMAL

## 2023-11-06 ENCOUNTER — PRE VISIT (OUTPATIENT)
Dept: MATERNAL FETAL MEDICINE | Facility: CLINIC | Age: 33
End: 2023-11-06
Payer: COMMERCIAL

## 2023-11-13 ENCOUNTER — OFFICE VISIT (OUTPATIENT)
Dept: MATERNAL FETAL MEDICINE | Facility: CLINIC | Age: 33
End: 2023-11-13
Attending: OBSTETRICS & GYNECOLOGY
Payer: COMMERCIAL

## 2023-11-13 ENCOUNTER — HOSPITAL ENCOUNTER (OUTPATIENT)
Dept: ULTRASOUND IMAGING | Facility: CLINIC | Age: 33
Discharge: HOME | End: 2023-11-13
Attending: OBSTETRICS & GYNECOLOGY
Payer: COMMERCIAL

## 2023-11-13 DIAGNOSIS — O26.90 PREGNANCY RELATED CONDITION, ANTEPARTUM: ICD-10-CM

## 2023-11-13 DIAGNOSIS — O35.2XX0 HEREDITARY DISEASE IN FAMILY POSSIBLY AFFECTING FETUS, AFFECTING MANAGEMENT OF MOTHER IN PREGNANCY, SINGLE OR UNSPECIFIED FETUS: Primary | ICD-10-CM

## 2023-11-13 PROCEDURE — 76811 OB US DETAILED SNGL FETUS: CPT

## 2023-11-13 PROCEDURE — 76811 OB US DETAILED SNGL FETUS: CPT | Mod: 26 | Performed by: OBSTETRICS & GYNECOLOGY

## 2023-11-13 PROCEDURE — 99203 OFFICE O/P NEW LOW 30 MIN: CPT | Mod: 25 | Performed by: OBSTETRICS & GYNECOLOGY

## 2023-11-13 NOTE — NURSING NOTE
Patient presents to [facility] for L2 due to balanced translocation. Reports no pain, no contractions, leaking of fluid, or bleeding.   SBAR given to LUIS MANUEL MD, see their note in Epic.

## 2023-11-13 NOTE — PROGRESS NOTES
Please see full imaging report from ViewPoint program under imaging tab.    Thank-you for referring your patient for ultrasound assessment.    I discussed the findings on today's ultrasound with the patient and her partner. I reviewed the limitations of ultrasound both in detecting aneuploidy and structural abnormalities.  Ultrasound, when views completed, can routinely detect 80-90% of structural abnormalities. She had low risk cell free fetal DNA for genetic screening this pregnancy.     She also met with our genetic counselor previously due to her known balanced translocation.     An echogenic intracardiac focus (EIF) was noted on today's ultrasound. This finding is seen in 3-5% of all pregnancies, and in the context of a normal ultrasound and her low risk cell free fetal DNA result it is considered a normal variant. We discussed that an EIF has no structural or functional implications on cardiac function and further evaluation is not necessary either prenatally or postnatally. , They were also provided with a handout reviewing this information.     Follow-up is scheduled here in three weeks to reassess anatomy that was suboptimally seen today.     Return to primary provider for continued prenatal care.    If you have questions regarding today's evaluation or if we can be of further service, please contact the Maternal-Fetal Medicine Center.    **Fetal anomalies may be present but not detected**     I spent a total of 15 minutes on the date of this encounter including preparing to see the patient (reviewing medical records/tests), counseling and discussing the plan of care, documenting the visit in the electronic medical record, and communicating with other health care professionals and/or care coordination.    Elisa Kowalski MD  Maternal Fetal Medicine

## 2023-12-04 ENCOUNTER — OFFICE VISIT (OUTPATIENT)
Dept: MATERNAL FETAL MEDICINE | Facility: CLINIC | Age: 33
End: 2023-12-04
Attending: OBSTETRICS & GYNECOLOGY
Payer: COMMERCIAL

## 2023-12-04 ENCOUNTER — HOSPITAL ENCOUNTER (OUTPATIENT)
Dept: ULTRASOUND IMAGING | Facility: CLINIC | Age: 33
Discharge: HOME | End: 2023-12-04
Attending: OBSTETRICS & GYNECOLOGY
Payer: COMMERCIAL

## 2023-12-04 DIAGNOSIS — Z36.2 ENCOUNTER FOR FOLLOW-UP ULTRASOUND OF FETAL ANATOMY: Primary | ICD-10-CM

## 2023-12-04 DIAGNOSIS — Z82.79 FAMILY HISTORY OF CONGENITAL OR GENETIC CONDITION: ICD-10-CM

## 2023-12-04 DIAGNOSIS — O35.2XX0 HEREDITARY DISEASE IN FAMILY POSSIBLY AFFECTING FETUS, AFFECTING MANAGEMENT OF MOTHER IN PREGNANCY, SINGLE OR UNSPECIFIED FETUS: ICD-10-CM

## 2023-12-04 PROCEDURE — 76816 OB US FOLLOW-UP PER FETUS: CPT | Mod: 26 | Performed by: STUDENT IN AN ORGANIZED HEALTH CARE EDUCATION/TRAINING PROGRAM

## 2023-12-04 PROCEDURE — 76816 OB US FOLLOW-UP PER FETUS: CPT

## 2023-12-04 PROCEDURE — 99213 OFFICE O/P EST LOW 20 MIN: CPT | Mod: 25 | Performed by: STUDENT IN AN ORGANIZED HEALTH CARE EDUCATION/TRAINING PROGRAM

## 2023-12-04 NOTE — PROGRESS NOTES
"Please see \"Imaging\" tab under \"Chart Review\" for details of today's visit.    Rebekah Martin    "

## 2023-12-04 NOTE — NURSING NOTE
Patient presents to [facility] for RL2 at 21w2d due to suboptimal anatomy. Denies LOF, vaginal bleeding, cramping/contractions. SBAR given to DAKOTA MD, see their note in Epic.

## 2023-12-21 ENCOUNTER — OFFICE VISIT (OUTPATIENT)
Dept: MATERNAL FETAL MEDICINE | Facility: CLINIC | Age: 33
End: 2023-12-21
Attending: STUDENT IN AN ORGANIZED HEALTH CARE EDUCATION/TRAINING PROGRAM
Payer: COMMERCIAL

## 2023-12-21 ENCOUNTER — HOSPITAL ENCOUNTER (OUTPATIENT)
Dept: ULTRASOUND IMAGING | Facility: CLINIC | Age: 33
Discharge: HOME | End: 2023-12-21
Attending: STUDENT IN AN ORGANIZED HEALTH CARE EDUCATION/TRAINING PROGRAM
Payer: COMMERCIAL

## 2023-12-21 DIAGNOSIS — Z36.2 ENCOUNTER FOR FOLLOW-UP ULTRASOUND OF FETAL ANATOMY: ICD-10-CM

## 2023-12-21 DIAGNOSIS — Z36.2 ENCOUNTER FOR FOLLOW-UP ULTRASOUND OF FETAL ANATOMY: Primary | ICD-10-CM

## 2023-12-21 DIAGNOSIS — Q99.8 CHROMOSOMAL TRANSLOCATION: ICD-10-CM

## 2023-12-21 PROCEDURE — 76816 OB US FOLLOW-UP PER FETUS: CPT | Mod: 26 | Performed by: STUDENT IN AN ORGANIZED HEALTH CARE EDUCATION/TRAINING PROGRAM

## 2023-12-21 PROCEDURE — 76816 OB US FOLLOW-UP PER FETUS: CPT

## 2023-12-21 NOTE — PROGRESS NOTES
Please see the full imaging report from the ViewPoint program under the imaging tab.    Rita Martin MD  Maternal Fetal Medicine

## 2023-12-21 NOTE — NURSING NOTE
Patient presents to [facility] for RL2 at 23w5d due to suboptimal anatomy. Positive fetal movement. Denies LOF, vaginal bleeding or cramping/contractions. SBAR given to M MD, see their note in Epic.

## 2024-01-22 ENCOUNTER — LAB REQUISITION (OUTPATIENT)
Dept: LAB | Facility: CLINIC | Age: 34
End: 2024-01-22

## 2024-01-22 DIAGNOSIS — Z36.89 ENCOUNTER FOR OTHER SPECIFIED ANTENATAL SCREENING: ICD-10-CM

## 2024-01-22 LAB
ERYTHROCYTE [DISTWIDTH] IN BLOOD BY AUTOMATED COUNT: 13.2 % (ref 10–15)
HCT VFR BLD AUTO: 33.7 % (ref 35–47)
HGB BLD-MCNC: 11.2 G/DL (ref 11.7–15.7)
MCH RBC QN AUTO: 29.6 PG (ref 26.5–33)
MCHC RBC AUTO-ENTMCNC: 33.2 G/DL (ref 31.5–36.5)
MCV RBC AUTO: 89 FL (ref 78–100)
PLATELET # BLD AUTO: 240 10E3/UL (ref 150–450)
RBC # BLD AUTO: 3.79 10E6/UL (ref 3.8–5.2)
WBC # BLD AUTO: 9.7 10E3/UL (ref 4–11)

## 2024-01-22 PROCEDURE — 86592 SYPHILIS TEST NON-TREP QUAL: CPT | Performed by: ADVANCED PRACTICE MIDWIFE

## 2024-01-22 PROCEDURE — 85027 COMPLETE CBC AUTOMATED: CPT | Performed by: ADVANCED PRACTICE MIDWIFE

## 2024-01-23 LAB — RPR SER QL: NONREACTIVE

## 2024-03-19 ENCOUNTER — LAB REQUISITION (OUTPATIENT)
Dept: LAB | Facility: CLINIC | Age: 34
End: 2024-03-19
Payer: COMMERCIAL

## 2024-03-19 DIAGNOSIS — Z3A.36 36 WEEKS GESTATION OF PREGNANCY: ICD-10-CM

## 2024-03-19 PROCEDURE — 87653 STREP B DNA AMP PROBE: CPT | Performed by: OBSTETRICS & GYNECOLOGY

## 2024-03-19 PROCEDURE — 87653 STREP B DNA AMP PROBE: CPT | Mod: ORL | Performed by: OBSTETRICS & GYNECOLOGY

## 2024-03-20 LAB — GP B STREP DNA SPEC QL NAA+PROBE: NEGATIVE

## 2024-04-18 ENCOUNTER — HOSPITAL ENCOUNTER (OUTPATIENT)
Facility: CLINIC | Age: 34
Discharge: HOME | End: 2024-04-18
Attending: OBSTETRICS & GYNECOLOGY | Admitting: OBSTETRICS & GYNECOLOGY
Payer: COMMERCIAL

## 2024-04-18 VITALS
SYSTOLIC BLOOD PRESSURE: 121 MMHG | DIASTOLIC BLOOD PRESSURE: 71 MMHG | RESPIRATION RATE: 16 BRPM | HEIGHT: 71 IN | TEMPERATURE: 97.5 F | BODY MASS INDEX: 28.14 KG/M2 | WEIGHT: 201 LBS

## 2024-04-18 PROBLEM — Z34.90 PREGNANCY: Status: ACTIVE | Noted: 2024-04-18

## 2024-04-18 PROCEDURE — G0463 HOSPITAL OUTPT CLINIC VISIT: HCPCS | Mod: 25

## 2024-04-18 PROCEDURE — 59025 FETAL NON-STRESS TEST: CPT

## 2024-04-18 RX ORDER — ONDANSETRON 2 MG/ML
4 INJECTION INTRAMUSCULAR; INTRAVENOUS EVERY 6 HOURS PRN
Status: DISCONTINUED | OUTPATIENT
Start: 2024-04-18 | End: 2024-04-18 | Stop reason: HOSPADM

## 2024-04-18 RX ORDER — METOCLOPRAMIDE 10 MG/1
10 TABLET ORAL EVERY 6 HOURS PRN
Status: DISCONTINUED | OUTPATIENT
Start: 2024-04-18 | End: 2024-04-18 | Stop reason: HOSPADM

## 2024-04-18 RX ORDER — ONDANSETRON 4 MG/1
4 TABLET, ORALLY DISINTEGRATING ORAL EVERY 6 HOURS PRN
Status: DISCONTINUED | OUTPATIENT
Start: 2024-04-18 | End: 2024-04-18 | Stop reason: HOSPADM

## 2024-04-18 RX ORDER — PNV NO.95/FERROUS FUM/FOLIC AC 28MG-0.8MG
1 TABLET ORAL DAILY
COMMUNITY

## 2024-04-18 RX ORDER — PROCHLORPERAZINE MALEATE 5 MG
10 TABLET ORAL EVERY 6 HOURS PRN
Status: DISCONTINUED | OUTPATIENT
Start: 2024-04-18 | End: 2024-04-18 | Stop reason: HOSPADM

## 2024-04-18 RX ORDER — METOCLOPRAMIDE HYDROCHLORIDE 5 MG/ML
10 INJECTION INTRAMUSCULAR; INTRAVENOUS EVERY 6 HOURS PRN
Status: DISCONTINUED | OUTPATIENT
Start: 2024-04-18 | End: 2024-04-18 | Stop reason: HOSPADM

## 2024-04-18 RX ORDER — CLINDAMYCIN PHOSPHATE 10 UG/ML
LOTION TOPICAL DAILY
COMMUNITY

## 2024-04-18 RX ORDER — PROCHLORPERAZINE 25 MG
25 SUPPOSITORY, RECTAL RECTAL EVERY 12 HOURS PRN
Status: DISCONTINUED | OUTPATIENT
Start: 2024-04-18 | End: 2024-04-18 | Stop reason: HOSPADM

## 2024-04-18 ASSESSMENT — ACTIVITIES OF DAILY LIVING (ADL)
ADLS_ACUITY_SCORE: 20
ADLS_ACUITY_SCORE: 20

## 2024-04-18 NOTE — PLAN OF CARE
1349 -  at 40+5/7 weeks presents to [facility] from home for eval of decreased fetal movement.  POC discussed including monitoring and VS.  Patient agrees.  Monitors applied. Admission assessment completed.  Patient denies VB, LOF or contractions.  Patient reports that she has been feeling 10 movements per kick count measurement, but she has to eat something sugary before the movements happen.  Initial BP slightly elevated.  Will recheck.    1420 - Repeat BP WNL x2    1445 - Patient feeling good fetal movement; has an appt tomorrow with Dr. Dennis to discuss postdated induction for Monday.  Dr. Medina text paged and updated via phone re: patient's arrival, current complaints, medical nad pregnancy history, admission assessment, FHT's, contractions, VS and patient now feeling movement.  Orders received to discharge patient to home and follow up in clinic tomorrow as scheduled.  Patient updated on POC and agrees.  Monitors removed.  Discharge instructions given re: labor precautions and fetal kick counts.  Patient and  deny further questions at this time.  Patient discharged to home undelivered.

## 2024-04-18 NOTE — DISCHARGE INSTRUCTIONS
Learning About When to Call Your Doctor During Pregnancy (After 20 Weeks)  Overview  It's common to have concerns about what might be a problem when you're pregnant. Most pregnancies don't have any serious problems. But it's still important to know when to call your doctor if you have certain symptoms or signs of labor.  These are general suggestions. Your doctor may give you some more information about when to call.  When to call your doctor (after 20 weeks)  Call 911  anytime you think you may need emergency care. For example, call if:  You have severe vaginal bleeding. This means you are soaking through a pad each hour for 2 or more hours.  You have sudden, severe pain in your belly.  You have chest pain, are short of breath, or cough up blood.  You passed out (lost consciousness).  You have a seizure.  You see or feel the umbilical cord.  You think you are about to deliver your baby and can't make it safely to the hospital or birthing center.  Call your doctor now or seek immediate medical care if:  You have vaginal bleeding.  You have belly pain.  You have a fever.  You are dizzy or lightheaded, or you feel like you may faint.  You have symptoms of preeclampsia, such as:  Sudden swelling of your face, hands, or feet.  New vision problems (such as dimness, blurring, or seeing spots).  A severe headache.  You have a sudden release of fluid from your vagina. (You think your water broke.)  You've been having regular contractions for an hour. This means that you've had at least 6 contractions within 1 hour, even after you change your position and drink fluids.  You notice that your baby has stopped moving or is moving less than normal.  You have symptoms of a urinary tract infection. These may include:  Pain or burning when you urinate.  A frequent need to urinate without being able to pass much urine.  Pain in the flank, which is just below the rib cage and above the waist on either side of the back.  Blood in your  "urine.  Watch closely for changes in your health, and be sure to contact your doctor if:  You have vaginal discharge that smells bad.  You feel sad, anxious, or hopeless for more than a few days.  You have skin changes, such as a rash, itching, or a yellow color to your skin.  You have other concerns about your pregnancy.  If you have labor signs at 37 weeks or more  If you have signs of labor at 37 weeks or more, your doctor may tell you to call when your labor becomes more active. Symptoms of active labor include:  Contractions that are regular.  Contractions that are less than 5 minutes apart.  Contractions that are hard to talk through.  Follow-up care is a key part of your treatment and safety. Be sure to make and go to all appointments, and call your doctor if you are having problems. It's also a good idea to know your test results and keep a list of the medicines you take.  Where can you learn more?  Go to https://www.TwoChop.Tap2print/patiented  Enter N531 in the search box to learn more about \"Learning About When to Call Your Doctor During Pregnancy (After 20 Weeks).\"  Current as of: July 10, 2023               Content Version: 14.0    7315-7800 [employer].   Care instructions adapted under license by your healthcare professional. If you have questions about a medical condition or this instruction, always ask your healthcare professional. [employer] disclaims any warranty or liability for your use of this information.    Counting Your Baby's Kicks: Care Instructions  Overview  Counting your baby's kicks is one way your doctor can tell that your baby is healthy. You will probably feel your baby move for the first time between 16 and 22 weeks. The movement may feel like flutters rather than kicks. Your baby may move more at certain times of the day. When you are active, you may notice less kicking than when you are resting. At your prenatal visits, your doctor will ask whether the " "baby is active.  In your last trimester, your doctor may ask you to count the number of times you feel your baby move.  Follow-up care is a key part of your treatment and safety. Be sure to make and go to all appointments, and call your doctor if you are having problems. It's also a good idea to know your test results and keep a list of the medicines you take.  How do you count fetal kicks?  A common method of checking your baby's movement is to note the length of time it takes to count 10 movements (such as kicks, flutters, or rolls).  Pick your baby's most active time of day to count. This may be any time from morning to evening.  If you don't feel 10 movements in an hour, have something to eat or drink and count for another hour. If you don't feel at least 10 movements in the 2-hour period, call your doctor.  Do not use an at-home Doppler heart monitor in place of counting fetal movements.  When should you call for help?   Call your doctor now or seek immediate medical care if:    You feel fewer than 10 movements in a 2-hour period.     You noticed that your baby has stopped moving or is moving less than normal.   Watch closely for changes in your health, and be sure to contact your doctor if you have any problems.  Where can you learn more?  Go to https://www.StraighterLine.net/patiented  Enter U048 in the search box to learn more about \"Counting Your Baby's Kicks: Care Instructions.\"  Current as of: July 10, 2023               Content Version: 14.0    3017-6826 [employer].   Care instructions adapted under license by your healthcare professional. If you have questions about a medical condition or this instruction, always ask your healthcare professional. [employer] disclaims any warranty or liability for your use of this information.            "

## 2024-04-19 ENCOUNTER — ANESTHESIA EVENT (OUTPATIENT)
Dept: OBGYN | Facility: CLINIC | Age: 34
End: 2024-04-19
Payer: COMMERCIAL

## 2024-04-19 ENCOUNTER — HOSPITAL ENCOUNTER (INPATIENT)
Facility: CLINIC | Age: 34
LOS: 2 days | Discharge: HOME | End: 2024-04-21
Attending: OBSTETRICS & GYNECOLOGY | Admitting: OBSTETRICS & GYNECOLOGY
Payer: COMMERCIAL

## 2024-04-19 ENCOUNTER — ANESTHESIA (OUTPATIENT)
Dept: OBGYN | Facility: CLINIC | Age: 34
End: 2024-04-19
Payer: COMMERCIAL

## 2024-04-19 PROBLEM — Z36.89 ENCOUNTER FOR TRIAGE IN PREGNANT PATIENT: Status: ACTIVE | Noted: 2024-04-19

## 2024-04-19 LAB
ABO/RH(D): NORMAL
ANTIBODY SCREEN: NEGATIVE
BASOPHILS # BLD AUTO: 0 10E3/UL (ref 0–0.2)
BASOPHILS NFR BLD AUTO: 0 %
EOSINOPHIL # BLD AUTO: 0.1 10E3/UL (ref 0–0.7)
EOSINOPHIL NFR BLD AUTO: 1 %
ERYTHROCYTE [DISTWIDTH] IN BLOOD BY AUTOMATED COUNT: 13.7 % (ref 10–15)
HCT VFR BLD AUTO: 35.8 % (ref 35–47)
HGB BLD-MCNC: 12.1 G/DL (ref 11.7–15.7)
IMM GRANULOCYTES # BLD: 0.1 10E3/UL
IMM GRANULOCYTES NFR BLD: 1 %
LYMPHOCYTES # BLD AUTO: 3 10E3/UL (ref 0.8–5.3)
LYMPHOCYTES NFR BLD AUTO: 20 %
MCH RBC QN AUTO: 28.9 PG (ref 26.5–33)
MCHC RBC AUTO-ENTMCNC: 33.8 G/DL (ref 31.5–36.5)
MCV RBC AUTO: 86 FL (ref 78–100)
MONOCYTES # BLD AUTO: 1 10E3/UL (ref 0–1.3)
MONOCYTES NFR BLD AUTO: 7 %
NEUTROPHILS # BLD AUTO: 11 10E3/UL (ref 1.6–8.3)
NEUTROPHILS NFR BLD AUTO: 71 %
NRBC # BLD AUTO: 0 10E3/UL
NRBC BLD AUTO-RTO: 0 /100
PLATELET # BLD AUTO: 259 10E3/UL (ref 150–450)
RBC # BLD AUTO: 4.18 10E6/UL (ref 3.8–5.2)
RUPTURE OF FETAL MEMBRANES BY ROM PLUS: NEGATIVE
SPECIMEN EXPIRATION DATE: NORMAL
T PALLIDUM AB SER QL: NONREACTIVE
WBC # BLD AUTO: 15.2 10E3/UL (ref 4–11)

## 2024-04-19 PROCEDURE — 3E0R3BZ INTRODUCTION OF ANESTHETIC AGENT INTO SPINAL CANAL, PERCUTANEOUS APPROACH: ICD-10-PCS | Performed by: ANESTHESIOLOGY

## 2024-04-19 PROCEDURE — 120N000012 HC R&B POSTPARTUM

## 2024-04-19 PROCEDURE — G0463 HOSPITAL OUTPT CLINIC VISIT: HCPCS

## 2024-04-19 PROCEDURE — 0UC97ZZ EXTIRPATION OF MATTER FROM UTERUS, VIA NATURAL OR ARTIFICIAL OPENING: ICD-10-PCS | Performed by: OBSTETRICS & GYNECOLOGY

## 2024-04-19 PROCEDURE — 250N000011 HC RX IP 250 OP 636: Performed by: OBSTETRICS & GYNECOLOGY

## 2024-04-19 PROCEDURE — 250N000011 HC RX IP 250 OP 636: Performed by: ANESTHESIOLOGY

## 2024-04-19 PROCEDURE — 250N000009 HC RX 250: Performed by: OBSTETRICS & GYNECOLOGY

## 2024-04-19 PROCEDURE — 59400 OBSTETRICAL CARE: CPT | Performed by: ANESTHESIOLOGY

## 2024-04-19 PROCEDURE — 0KQM0ZZ REPAIR PERINEUM MUSCLE, OPEN APPROACH: ICD-10-PCS | Performed by: OBSTETRICS & GYNECOLOGY

## 2024-04-19 PROCEDURE — 370N000003 HC ANESTHESIA WARD SERVICE: Performed by: ANESTHESIOLOGY

## 2024-04-19 PROCEDURE — 250N000013 HC RX MED GY IP 250 OP 250 PS 637: Performed by: OBSTETRICS & GYNECOLOGY

## 2024-04-19 PROCEDURE — 84112 EVAL AMNIOTIC FLUID PROTEIN: CPT | Performed by: OBSTETRICS & GYNECOLOGY

## 2024-04-19 PROCEDURE — 00HU33Z INSERTION OF INFUSION DEVICE INTO SPINAL CANAL, PERCUTANEOUS APPROACH: ICD-10-PCS | Performed by: ANESTHESIOLOGY

## 2024-04-19 PROCEDURE — 258N000003 HC RX IP 258 OP 636: Performed by: OBSTETRICS & GYNECOLOGY

## 2024-04-19 PROCEDURE — 86780 TREPONEMA PALLIDUM: CPT | Performed by: OBSTETRICS & GYNECOLOGY

## 2024-04-19 PROCEDURE — 36415 COLL VENOUS BLD VENIPUNCTURE: CPT | Performed by: OBSTETRICS & GYNECOLOGY

## 2024-04-19 PROCEDURE — 85025 COMPLETE CBC W/AUTO DIFF WBC: CPT | Performed by: OBSTETRICS & GYNECOLOGY

## 2024-04-19 PROCEDURE — 722N000001 HC LABOR CARE VAGINAL DELIVERY SINGLE

## 2024-04-19 PROCEDURE — 86900 BLOOD TYPING SEROLOGIC ABO: CPT | Performed by: OBSTETRICS & GYNECOLOGY

## 2024-04-19 RX ORDER — NALOXONE HYDROCHLORIDE 0.4 MG/ML
0.2 INJECTION, SOLUTION INTRAMUSCULAR; INTRAVENOUS; SUBCUTANEOUS
Status: DISCONTINUED | OUTPATIENT
Start: 2024-04-19 | End: 2024-04-19 | Stop reason: HOSPADM

## 2024-04-19 RX ORDER — MISOPROSTOL 200 UG/1
800 TABLET ORAL
Status: DISCONTINUED | OUTPATIENT
Start: 2024-04-19 | End: 2024-04-21 | Stop reason: HOSPADM

## 2024-04-19 RX ORDER — NALOXONE HYDROCHLORIDE 0.4 MG/ML
0.4 INJECTION, SOLUTION INTRAMUSCULAR; INTRAVENOUS; SUBCUTANEOUS
Status: DISCONTINUED | OUTPATIENT
Start: 2024-04-19 | End: 2024-04-19 | Stop reason: HOSPADM

## 2024-04-19 RX ORDER — HYDROCORTISONE 25 MG/G
CREAM TOPICAL 3 TIMES DAILY PRN
Status: DISCONTINUED | OUTPATIENT
Start: 2024-04-19 | End: 2024-04-21 | Stop reason: HOSPADM

## 2024-04-19 RX ORDER — PROCHLORPERAZINE 25 MG
25 SUPPOSITORY, RECTAL RECTAL EVERY 12 HOURS PRN
Status: DISCONTINUED | OUTPATIENT
Start: 2024-04-19 | End: 2024-04-19 | Stop reason: HOSPADM

## 2024-04-19 RX ORDER — METOCLOPRAMIDE 10 MG/1
10 TABLET ORAL EVERY 6 HOURS PRN
Status: DISCONTINUED | OUTPATIENT
Start: 2024-04-19 | End: 2024-04-19 | Stop reason: HOSPADM

## 2024-04-19 RX ORDER — METOCLOPRAMIDE HYDROCHLORIDE 5 MG/ML
10 INJECTION INTRAMUSCULAR; INTRAVENOUS EVERY 6 HOURS PRN
Status: DISCONTINUED | OUTPATIENT
Start: 2024-04-19 | End: 2024-04-19 | Stop reason: HOSPADM

## 2024-04-19 RX ORDER — SODIUM CHLORIDE, SODIUM LACTATE, POTASSIUM CHLORIDE, CALCIUM CHLORIDE 600; 310; 30; 20 MG/100ML; MG/100ML; MG/100ML; MG/100ML
INJECTION, SOLUTION INTRAVENOUS CONTINUOUS PRN
Status: DISCONTINUED | OUTPATIENT
Start: 2024-04-19 | End: 2024-04-19 | Stop reason: HOSPADM

## 2024-04-19 RX ORDER — TRANEXAMIC ACID 10 MG/ML
1 INJECTION, SOLUTION INTRAVENOUS EVERY 30 MIN PRN
Status: DISCONTINUED | OUTPATIENT
Start: 2024-04-19 | End: 2024-04-19 | Stop reason: HOSPADM

## 2024-04-19 RX ORDER — CARBOPROST TROMETHAMINE 250 UG/ML
250 INJECTION, SOLUTION INTRAMUSCULAR
Status: DISCONTINUED | OUTPATIENT
Start: 2024-04-19 | End: 2024-04-19 | Stop reason: HOSPADM

## 2024-04-19 RX ORDER — ONDANSETRON 2 MG/ML
4 INJECTION INTRAMUSCULAR; INTRAVENOUS EVERY 6 HOURS PRN
Status: DISCONTINUED | OUTPATIENT
Start: 2024-04-19 | End: 2024-04-19 | Stop reason: HOSPADM

## 2024-04-19 RX ORDER — OXYTOCIN/0.9 % SODIUM CHLORIDE 30/500 ML
100-340 PLASTIC BAG, INJECTION (ML) INTRAVENOUS CONTINUOUS PRN
Status: DISCONTINUED | OUTPATIENT
Start: 2024-04-19 | End: 2024-04-20

## 2024-04-19 RX ORDER — LOPERAMIDE HCL 2 MG
4 CAPSULE ORAL
Status: DISCONTINUED | OUTPATIENT
Start: 2024-04-19 | End: 2024-04-21 | Stop reason: HOSPADM

## 2024-04-19 RX ORDER — OXYTOCIN 10 [USP'U]/ML
10 INJECTION, SOLUTION INTRAMUSCULAR; INTRAVENOUS
Status: DISCONTINUED | OUTPATIENT
Start: 2024-04-19 | End: 2024-04-21 | Stop reason: HOSPADM

## 2024-04-19 RX ORDER — KETOROLAC TROMETHAMINE 30 MG/ML
30 INJECTION, SOLUTION INTRAMUSCULAR; INTRAVENOUS
Status: DISCONTINUED | OUTPATIENT
Start: 2024-04-19 | End: 2024-04-20

## 2024-04-19 RX ORDER — LOPERAMIDE HCL 2 MG
2 CAPSULE ORAL
Status: DISCONTINUED | OUTPATIENT
Start: 2024-04-19 | End: 2024-04-19 | Stop reason: HOSPADM

## 2024-04-19 RX ORDER — OXYTOCIN 10 [USP'U]/ML
10 INJECTION, SOLUTION INTRAMUSCULAR; INTRAVENOUS
Status: DISCONTINUED | OUTPATIENT
Start: 2024-04-19 | End: 2024-04-20

## 2024-04-19 RX ORDER — METHYLERGONOVINE MALEATE 0.2 MG/ML
200 INJECTION INTRAVENOUS
Status: DISCONTINUED | OUTPATIENT
Start: 2024-04-19 | End: 2024-04-19 | Stop reason: HOSPADM

## 2024-04-19 RX ORDER — CITRIC ACID/SODIUM CITRATE 334-500MG
30 SOLUTION, ORAL ORAL
Status: DISCONTINUED | OUTPATIENT
Start: 2024-04-19 | End: 2024-04-19 | Stop reason: HOSPADM

## 2024-04-19 RX ORDER — SODIUM CHLORIDE, SODIUM LACTATE, POTASSIUM CHLORIDE, CALCIUM CHLORIDE 600; 310; 30; 20 MG/100ML; MG/100ML; MG/100ML; MG/100ML
INJECTION, SOLUTION INTRAVENOUS CONTINUOUS
Status: DISCONTINUED | OUTPATIENT
Start: 2024-04-19 | End: 2024-04-19 | Stop reason: HOSPADM

## 2024-04-19 RX ORDER — LOPERAMIDE HCL 2 MG
4 CAPSULE ORAL
Status: DISCONTINUED | OUTPATIENT
Start: 2024-04-19 | End: 2024-04-19 | Stop reason: HOSPADM

## 2024-04-19 RX ORDER — IBUPROFEN 400 MG/1
800 TABLET, FILM COATED ORAL
Status: DISCONTINUED | OUTPATIENT
Start: 2024-04-19 | End: 2024-04-20

## 2024-04-19 RX ORDER — ONDANSETRON 4 MG/1
4 TABLET, ORALLY DISINTEGRATING ORAL EVERY 6 HOURS PRN
Status: DISCONTINUED | OUTPATIENT
Start: 2024-04-19 | End: 2024-04-19 | Stop reason: HOSPADM

## 2024-04-19 RX ORDER — FENTANYL CITRATE-0.9 % NACL/PF 10 MCG/ML
100 PLASTIC BAG, INJECTION (ML) INTRAVENOUS EVERY 5 MIN PRN
Status: DISCONTINUED | OUTPATIENT
Start: 2024-04-19 | End: 2024-04-19 | Stop reason: HOSPADM

## 2024-04-19 RX ORDER — ROPIVACAINE HYDROCHLORIDE 2 MG/ML
INJECTION, SOLUTION EPIDURAL; INFILTRATION; PERINEURAL
Status: COMPLETED | OUTPATIENT
Start: 2024-04-19 | End: 2024-04-19

## 2024-04-19 RX ORDER — MISOPROSTOL 200 UG/1
400 TABLET ORAL
Status: DISCONTINUED | OUTPATIENT
Start: 2024-04-19 | End: 2024-04-21 | Stop reason: HOSPADM

## 2024-04-19 RX ORDER — PROCHLORPERAZINE MALEATE 5 MG
10 TABLET ORAL EVERY 6 HOURS PRN
Status: DISCONTINUED | OUTPATIENT
Start: 2024-04-19 | End: 2024-04-19 | Stop reason: HOSPADM

## 2024-04-19 RX ORDER — LIDOCAINE 40 MG/G
CREAM TOPICAL
Status: DISCONTINUED | OUTPATIENT
Start: 2024-04-19 | End: 2024-04-19 | Stop reason: HOSPADM

## 2024-04-19 RX ORDER — METHYLERGONOVINE MALEATE 0.2 MG/ML
200 INJECTION INTRAVENOUS
Status: DISCONTINUED | OUTPATIENT
Start: 2024-04-19 | End: 2024-04-21 | Stop reason: HOSPADM

## 2024-04-19 RX ORDER — LIDOCAINE HYDROCHLORIDE AND EPINEPHRINE 15; 5 MG/ML; UG/ML
3 INJECTION, SOLUTION EPIDURAL
Status: DISCONTINUED | OUTPATIENT
Start: 2024-04-19 | End: 2024-04-19 | Stop reason: HOSPADM

## 2024-04-19 RX ORDER — ACETAMINOPHEN 325 MG/1
650 TABLET ORAL EVERY 4 HOURS PRN
Status: DISCONTINUED | OUTPATIENT
Start: 2024-04-19 | End: 2024-04-19 | Stop reason: HOSPADM

## 2024-04-19 RX ORDER — OXYTOCIN/0.9 % SODIUM CHLORIDE 30/500 ML
340 PLASTIC BAG, INJECTION (ML) INTRAVENOUS CONTINUOUS PRN
Status: DISCONTINUED | OUTPATIENT
Start: 2024-04-19 | End: 2024-04-21 | Stop reason: HOSPADM

## 2024-04-19 RX ORDER — OXYTOCIN 10 [USP'U]/ML
10 INJECTION, SOLUTION INTRAMUSCULAR; INTRAVENOUS
Status: DISCONTINUED | OUTPATIENT
Start: 2024-04-19 | End: 2024-04-19 | Stop reason: HOSPADM

## 2024-04-19 RX ORDER — DOCUSATE SODIUM 100 MG/1
100 CAPSULE, LIQUID FILLED ORAL DAILY
Status: DISCONTINUED | OUTPATIENT
Start: 2024-04-19 | End: 2024-04-21 | Stop reason: HOSPADM

## 2024-04-19 RX ORDER — OXYTOCIN/0.9 % SODIUM CHLORIDE 30/500 ML
340 PLASTIC BAG, INJECTION (ML) INTRAVENOUS CONTINUOUS PRN
Status: DISCONTINUED | OUTPATIENT
Start: 2024-04-19 | End: 2024-04-19 | Stop reason: HOSPADM

## 2024-04-19 RX ORDER — MISOPROSTOL 200 UG/1
800 TABLET ORAL
Status: DISCONTINUED | OUTPATIENT
Start: 2024-04-19 | End: 2024-04-19 | Stop reason: HOSPADM

## 2024-04-19 RX ORDER — OXYTOCIN/0.9 % SODIUM CHLORIDE 30/500 ML
1-24 PLASTIC BAG, INJECTION (ML) INTRAVENOUS CONTINUOUS
Status: DISCONTINUED | OUTPATIENT
Start: 2024-04-19 | End: 2024-04-19 | Stop reason: HOSPADM

## 2024-04-19 RX ORDER — CARBOPROST TROMETHAMINE 250 UG/ML
250 INJECTION, SOLUTION INTRAMUSCULAR
Status: DISCONTINUED | OUTPATIENT
Start: 2024-04-19 | End: 2024-04-21 | Stop reason: HOSPADM

## 2024-04-19 RX ORDER — TERBUTALINE SULFATE 1 MG/ML
0.25 INJECTION, SOLUTION SUBCUTANEOUS
Status: DISCONTINUED | OUTPATIENT
Start: 2024-04-19 | End: 2024-04-19 | Stop reason: HOSPADM

## 2024-04-19 RX ORDER — FENTANYL CITRATE 50 UG/ML
50 INJECTION, SOLUTION INTRAMUSCULAR; INTRAVENOUS EVERY 30 MIN PRN
Status: DISCONTINUED | OUTPATIENT
Start: 2024-04-19 | End: 2024-04-19 | Stop reason: HOSPADM

## 2024-04-19 RX ORDER — MODIFIED LANOLIN
OINTMENT (GRAM) TOPICAL
Status: DISCONTINUED | OUTPATIENT
Start: 2024-04-19 | End: 2024-04-21 | Stop reason: HOSPADM

## 2024-04-19 RX ORDER — TRANEXAMIC ACID 10 MG/ML
1 INJECTION, SOLUTION INTRAVENOUS EVERY 30 MIN PRN
Status: DISCONTINUED | OUTPATIENT
Start: 2024-04-19 | End: 2024-04-21 | Stop reason: HOSPADM

## 2024-04-19 RX ORDER — BISACODYL 10 MG
10 SUPPOSITORY, RECTAL RECTAL DAILY PRN
Status: DISCONTINUED | OUTPATIENT
Start: 2024-04-19 | End: 2024-04-21 | Stop reason: HOSPADM

## 2024-04-19 RX ORDER — NALBUPHINE HYDROCHLORIDE 20 MG/ML
2.5-5 INJECTION, SOLUTION INTRAMUSCULAR; INTRAVENOUS; SUBCUTANEOUS EVERY 6 HOURS PRN
Status: DISCONTINUED | OUTPATIENT
Start: 2024-04-19 | End: 2024-04-21 | Stop reason: HOSPADM

## 2024-04-19 RX ORDER — LOPERAMIDE HCL 2 MG
2 CAPSULE ORAL
Status: DISCONTINUED | OUTPATIENT
Start: 2024-04-19 | End: 2024-04-21 | Stop reason: HOSPADM

## 2024-04-19 RX ORDER — ACETAMINOPHEN 325 MG/1
650 TABLET ORAL EVERY 4 HOURS PRN
Status: DISCONTINUED | OUTPATIENT
Start: 2024-04-19 | End: 2024-04-21 | Stop reason: HOSPADM

## 2024-04-19 RX ORDER — ROPIVACAINE HYDROCHLORIDE 2 MG/ML
10 INJECTION, SOLUTION EPIDURAL; INFILTRATION; PERINEURAL ONCE
Status: DISCONTINUED | OUTPATIENT
Start: 2024-04-19 | End: 2024-04-19 | Stop reason: HOSPADM

## 2024-04-19 RX ORDER — IBUPROFEN 400 MG/1
800 TABLET, FILM COATED ORAL EVERY 6 HOURS PRN
Status: DISCONTINUED | OUTPATIENT
Start: 2024-04-19 | End: 2024-04-21 | Stop reason: HOSPADM

## 2024-04-19 RX ORDER — MISOPROSTOL 200 UG/1
400 TABLET ORAL
Status: DISCONTINUED | OUTPATIENT
Start: 2024-04-19 | End: 2024-04-19 | Stop reason: HOSPADM

## 2024-04-19 RX ADMIN — Medication: at 03:07

## 2024-04-19 RX ADMIN — SODIUM CHLORIDE, POTASSIUM CHLORIDE, SODIUM LACTATE AND CALCIUM CHLORIDE 1000 ML: 600; 310; 30; 20 INJECTION, SOLUTION INTRAVENOUS at 02:34

## 2024-04-19 RX ADMIN — Medication 100 MCG: at 04:14

## 2024-04-19 RX ADMIN — Medication 100 MCG: at 04:09

## 2024-04-19 RX ADMIN — IBUPROFEN 800 MG: 400 TABLET ORAL at 22:16

## 2024-04-19 RX ADMIN — ACETAMINOPHEN 650 MG: 325 TABLET, FILM COATED ORAL at 10:24

## 2024-04-19 RX ADMIN — ACETAMINOPHEN 650 MG: 325 TABLET, FILM COATED ORAL at 22:16

## 2024-04-19 RX ADMIN — SODIUM CHLORIDE, POTASSIUM CHLORIDE, SODIUM LACTATE AND CALCIUM CHLORIDE: 600; 310; 30; 20 INJECTION, SOLUTION INTRAVENOUS at 03:35

## 2024-04-19 RX ADMIN — Medication 340 ML/HR: at 08:35

## 2024-04-19 RX ADMIN — DOCUSATE SODIUM 100 MG: 100 CAPSULE, LIQUID FILLED ORAL at 10:24

## 2024-04-19 RX ADMIN — TRANEXAMIC ACID 1 G: 10 INJECTION, SOLUTION INTRAVENOUS at 09:01

## 2024-04-19 RX ADMIN — ROPIVACAINE HYDROCHLORIDE 10 ML: 2 INJECTION, SOLUTION EPIDURAL; INFILTRATION at 03:24

## 2024-04-19 RX ADMIN — ACETAMINOPHEN 650 MG: 325 TABLET, FILM COATED ORAL at 16:23

## 2024-04-19 RX ADMIN — IBUPROFEN 800 MG: 400 TABLET ORAL at 16:23

## 2024-04-19 RX ADMIN — METHYLERGONOVINE MALEATE 200 MCG: 0.2 INJECTION INTRAVENOUS at 09:02

## 2024-04-19 RX ADMIN — IBUPROFEN 800 MG: 400 TABLET ORAL at 10:23

## 2024-04-19 ASSESSMENT — ACTIVITIES OF DAILY LIVING (ADL)
ADLS_ACUITY_SCORE: 21
ADLS_ACUITY_SCORE: 20
ADLS_ACUITY_SCORE: 20
ADLS_ACUITY_SCORE: 25
ADLS_ACUITY_SCORE: 21
ADLS_ACUITY_SCORE: 20
ADLS_ACUITY_SCORE: 21
ADLS_ACUITY_SCORE: 20
ADLS_ACUITY_SCORE: 21
ADLS_ACUITY_SCORE: 20
ADLS_ACUITY_SCORE: 25
ADLS_ACUITY_SCORE: 21
ADLS_ACUITY_SCORE: 20
ADLS_ACUITY_SCORE: 20
ADLS_ACUITY_SCORE: 21
ADLS_ACUITY_SCORE: 21
ADLS_ACUITY_SCORE: 20
ADLS_ACUITY_SCORE: 21
ADLS_ACUITY_SCORE: 20

## 2024-04-19 NOTE — LACTATION NOTE
Lactation visit with Obdulia, significant other Darryl & baby girl Camille. Obdulia reports feeding is going well so far. Encouraged to call for latch checks as needed when working on breastfeeding. Discussed nipple cream and encouraged use as needed.  Reviewed milk supply and engorgement. General questions answered regarding when to expect mature milk over first 3-5 days.  General questions answered regarding pumping, when it's helpful and necessary. Reviewed general recommendation to wait to start pumping until breastfeeding is well established unless there are feeding difficulties or engorgement not relieved by feeding baby or hand expression. Discussed introducing a bottle and recommendation to wait for bottle introduction for 3-4 weeks unless baby needs to supplement for medical reasons. Encouraged to review Breastfeeding section in Your Guide to Postpartum &  Care. Discussed typical  feeding patterns, cluster feeding, and ways to wake a sleepy baby for feedings.    Feeding plan: Recommend unlimited, frequent breast feedings: At least 8 - 12 times every 24 hours. Avoid pacifiers and supplementation with formula unless medically indicated. Encouraged use of feeding log and to record feedings, and void/stool patterns. Obdulia has a pump for home use.  Encouraged to call with needs, will revisit as needed. Obdulia & Darryl very appreciative of visit.    Audelia Mitchell, RN, BSN, MNN, IBCLC

## 2024-04-19 NOTE — PROGRESS NOTES
Data: [facility]: 2024 12:33 AM.  Reason for maternal/fetal assessment is leaking vaginal fluid. Patient reports water breaking this evening at 11pm, contractions . Patient denies vaginal bleeding. Patient reports fetal movement is normal. Patient is a 40w6d .  Prenatal record reviewed. Pregnancy has been uncomplicated.    Vital signs wnl. Support person is present.     Action: Verbal consent for EFM. Triage assessment completed.     Response: Patient verbalized agreement with plan. Will contact Dr. Dennis with update and further orders.    Dr. Dennis updated about ROM plus result negative, SVE 3/80/-2 jeovany every 2-5 minutes and getting more intense since 11 per patient. GBS neg. FHT cat 1 .    Orders to admit patient and okay for pain meds as needed, plan to recheck cervix in a few hours if and start pitoicn at that time if cervix is unchanged.     Report given to Afsaneh, JR and pt transferred to room 219.

## 2024-04-19 NOTE — ANESTHESIA PROCEDURE NOTES
"Epidural catheter Procedure Note    Pre-Procedure   Staff -        Anesthesiologist:  Robson Mancini MD       Performed By: Anesthesiologist       Location: OB       Procedure Start/Stop Times: 4/19/2024 3:09 AM and 4/19/2024 3:24 AM       Pre-Anesthestic Checklist: patient identified, IV checked, site marked, risks and benefits discussed, informed consent, monitors and equipment checked and pre-op evaluation  Timeout:       Correct Patient: Yes        Correct Procedure: Yes        Correct Site: Yes        Correct Position: Yes   Procedure Documentation  Procedure: epidural catheter       Patient Position: sitting       Patient Prep/Sterile Barriers: sterile gloves, mask, patient draped       Skin prep: Betadine       Local skin infiltrated with 3 mL of 1% lidocaine.        Insertion Site: L4-5. (midline approach).       Technique: LORT air        Needle Type: Kingspan Windy needle       Needle Gauge: 17.        Needle Length (Inches): 3.5        Catheter: 19 G.          Catheter threaded easily.         3.5 cm epidural space.           # of attempts: 1 and  # of redirects:  0    Assessment/Narrative         Paresthesias: No.       Test dose of 3 mL lidocaine 1.5% w/ 1:200,000 epinephrine at 03:24 CDT.         Test dose negative, 3 minutes after injection, for signs of intravascular, subdural, or intrathecal injection.       Insertion/Infusion Method: LORT air       Aspiration negative for Heme or CSF via Epidural Catheter.    Medication(s) Administered   0.2% Ropivacaine (Epidural) - EPIDURAL   10 mL - 4/19/2024 3:24:00 AM  Medication Administration Time: 4/19/2024 3:09 AM     Comments:  Pt tolerated procedure well.   No complications.    The epidural was bolused with 10 ml of 0.2% Ropivacaine in incremental doses and intermittent negative aspiration after negative test dose.         FOR [facility] ([facility]) ONLY:   Pain Team Contact information: please page the Pain Team Via [employer]. Search \"Pain\". During daytime hours, " please page the attending first. At night please page the resident first.

## 2024-04-19 NOTE — PLAN OF CARE
0600- Pt is 9/100/0. Ctx every 2-5 min. Pt is comfortable with epidural. Minimal variability. Continuing repositioning.  Pitocin has not been started d/t minimal variability. Provider aware. AM nurse to check pt cervix at arrival.     0700- Moderate variability with accels.     0715- Report given to JR Carver. Relinquished cares at this time.

## 2024-04-19 NOTE — ANESTHESIA PREPROCEDURE EVALUATION
"Anesthesia Pre-Procedure Evaluation    Patient: Obdulia Crum   MRN: [id number] : 1990        Procedure : * No procedures listed *          Past Medical History:   Diagnosis Date    Anxiety       Past Surgical History:   Procedure Laterality Date    EYE SURGERY      as a baby    TONSILLECTOMY      in [facility]      Allergies   Allergen Reactions    Sulfa Antibiotics Rash      Social History     Tobacco Use    Smoking status: Never     Passive exposure: Never    Smokeless tobacco: Never   Substance Use Topics    Alcohol use: Not Currently      Wt Readings from Last 1 Encounters:   24 91.2 kg (201 lb)        Anesthesia Evaluation   Pt has had prior anesthetic.     No history of anesthetic complications       ROS/MED HX  ENT/Pulmonary:    (-) asthma   Neurologic:  - neg neurologic ROS     Cardiovascular:    (-) PIH   METS/Exercise Tolerance:     Hematologic:     (+)  no anticoagulation therapy, no coagulopathy,             Musculoskeletal:       GI/Hepatic:     (+) GERD,                   Renal/Genitourinary:       Endo:       Psychiatric/Substance Use:       Infectious Disease:       Malignancy:       Other:            Physical Exam    Airway        Mallampati: II   TM distance: > 3 FB   Neck ROM: full     Respiratory Devices and Support         Dental  no notable dental history         Cardiovascular   cardiovascular exam normal          Pulmonary   pulmonary exam normal                OUTSIDE LABS:  CBC:   Lab Results   Component Value Date    WBC 15.2 (H) 2024    WBC 9.7 2024    HGB 12.1 2024    HGB 11.2 (L) 2024    HCT 35.8 2024    HCT 33.7 (L) 2024     2024     2024     BMP: No results found for: \"NA\", \"POTASSIUM\", \"CHLORIDE\", \"CO2\", \"BUN\", \"CR\", \"GLC\"  COAGS: No results found for: \"PTT\", \"INR\", \"FIBR\"  POC: No results found for: \"BGM\", \"HCG\", \"HCGS\"  HEPATIC: No results found for: \"ALBUMIN\", \"PROTTOTAL\", \"ALT\", \"AST\", \"GGT\", \"ALKPHOS\", " "\"BILITOTAL\", \"BILIDIRECT\", \"SERJIO\"  OTHER: No results found for: \"PH\", \"LACT\", \"A1C\", \"THERESA\", \"PHOS\", \"MAG\", \"LIPASE\", \"AMYLASE\", \"TSH\", \"T4\", \"T3\", \"CRP\", \"SED\"    Anesthesia Plan    ASA Status:  2       Anesthesia Type: Epidural.              Consents    Anesthesia Plan(s) and associated risks, benefits, and realistic alternatives discussed. Questions answered and patient/representative(s) expressed understanding.     - Discussed:     - Discussed with:  Patient            Postoperative Care            Comments:    Other Comments: Orders to manage the epidural infusion have been entered, and through coordination with the nurse, we will continute to manage and monitor the patient's labor epidural.  We will continuously be available to adjust as needed thruout the entire L&D process.            Robson Mancini MD    I have reviewed the pertinent notes and labs in the chart from the past 30 days and (re)examined the patient.  Any updates or changes from those notes are reflected in this note.                  "

## 2024-04-19 NOTE — H&P
OB Brief Admit H&P    No significant change in general health status based on examination of the patient, review of Nursing Admission Database and prenatal record.    Pt is a 33 year old  @ 40w6d who presented to L&D with SROM/spontaneous labor.    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 Admit to labor and delivery   2. Labor: expectant mgmt, s/p epidural, anticipate   3. GBS neg. S/p TDAP    Emy Dennis MD  2024  7:23 AM

## 2024-04-19 NOTE — PLAN OF CARE
Goal Outcome Evaluation:      Plan of Care Reviewed With: patient, spouse        Patient transferred to room 424 at 1145. Patient and spouse oriented to postpartum. Patient taking Tylenol and Ibuprofen for mild discomfort with adequate relief. Patient ambulating independently, voiding without difficulty. Encouraged patient to call with needs/questions. Call light within reach, will continue with current plan of care.

## 2024-04-19 NOTE — PLAN OF CARE
Data: Patient admitted to room 219 at 0135. Patient is a . Prenatal record reviewed.   OB History    Para Term  AB Living   1 0 0 0 0 0   SAB IAB Ectopic Multiple Live Births   0 0 0 0 0      # Outcome Date GA Lbr Tl/2nd Weight Sex Type Anes PTL Lv   1 Current            .  Medical History:   Past Medical History:   Diagnosis Date    Anxiety    .  Gestational age 40w6d. Vital signs per doc flowsheet. Fetal movement present. Patient reports Rule Out Labor   as reason for admission. Support persons Darryl present.  Action: Report from Liz LAGOS RN obtained at arrival. Care of patient assumed at arrival. Verbal consent for EFM, external fetal monitors applied. Admission assessment completed. Patient and support persons educated on labor process. Patient instructed to report change in fetal movement, contractions, vaginal leaking of fluid or bleeding, abdominal pain, or any concerns related to the pregnancy to her nurse/physician. Patient oriented to room, call light in reach.   Response: Dr. Dennis informed of admission. Plan per provider is recheck cervix in 2 hours (0300). Patient verbalized understanding of education and verbalized agreement with plan.

## 2024-04-19 NOTE — PLAN OF CARE
0230- Pt requesting epidural. Fluid bolus started at this time.     0305- MDA at bedside for epidural procedure. Pt tolerated procedure well.     0335- Pt resting comfortably. Pagan placed. SVE at this time is 4.5/80/-1.     0410- Minimal variabiity on FHT's since 0230 with occasional moderate. Some accels, no decels. Dr. Dennis updated on status. Orders to start pitocin for augmentation as appropriate.

## 2024-04-19 NOTE — L&D DELIVERY NOTE
OB Delivery Summary      HISTORY OF PRESENT ILLNESS:   with  IUP @ 40w6d who presented with regular, painful contractions and leakage of fluid at home around 10pm.  Her prenatal course was  complicated by maternal balanced translocation (normal NIPT).  Prenatal labs were significant for blood type AB+, rubella status immune.  Glucose challenge test normal.  Group beta strep culture was negative.  Estimated fetal weight on admission was approximately 8lb        FIRST STAGE OF LABOR:  The patient was admitted to Labor and Delivery with a fetal heart rate tracing that was category I. SROM was confirmed on admission and cervix was 3/80/-2 on admit. She gradually progressed in labor and received an epidural for pain control at her request. She became completely dilated at 7:25pm      SECOND STAGE OF LABOR:  The patient began pushing at immediately.  She gradually brought the vertex down in the birth canal and delivered a viable female infant at 8:33am on 24.  After delivery of the head, the anterior shoulder and body delivered without difficulty.  No nuchal cord was noted. The infant was placed on the mother's chest.  Delayed cord clamping was performed.        THIRD STAGE OF LABOR:  The cord was clamped and cut. The placenta then delivered spontaneously and appeared intact with a 3-vessel cord at 8:50am.  Pitocin was started and fundal massage was performed.  Perineum was inspected and a second degree laceration was noted. It was repaired with a running suture of 3-0 Vicryl. Small bilateral labial lacerations were noted and repaired with interrupted sutures of 4-0 vicryl. Fundus was firm but she continued to have a small amount of constant bleeding. The cervix was inspected and intact. Manual uterine sweep was performed and a large clot was removed from the lower uterine segment. IM Methergine and IV TXA were given, and no further bleeding was noted. Quantitative blood loss for the delivery was 579mL.        Both mother and baby tolerated delivery well and there were no complications. Fetal weight was 6rh77ch (4380g) and Apgars were 9 and 9 at 1 and 5 minutes, respectively.       Emy Dennis MD  4/19/2024  9:29 AM

## 2024-04-20 LAB — HGB BLD-MCNC: 11.5 G/DL (ref 11.7–15.7)

## 2024-04-20 PROCEDURE — 85018 HEMOGLOBIN: CPT | Performed by: OBSTETRICS & GYNECOLOGY

## 2024-04-20 PROCEDURE — 36415 COLL VENOUS BLD VENIPUNCTURE: CPT | Performed by: OBSTETRICS & GYNECOLOGY

## 2024-04-20 PROCEDURE — 250N000013 HC RX MED GY IP 250 OP 250 PS 637: Performed by: OBSTETRICS & GYNECOLOGY

## 2024-04-20 PROCEDURE — 120N000012 HC R&B POSTPARTUM

## 2024-04-20 RX ORDER — ACETAMINOPHEN 325 MG/1
650 TABLET ORAL EVERY 4 HOURS PRN
Qty: 60 TABLET | Refills: 0 | Status: SHIPPED | OUTPATIENT
Start: 2024-04-20

## 2024-04-20 RX ORDER — IBUPROFEN 600 MG/1
600 TABLET, FILM COATED ORAL EVERY 6 HOURS PRN
Qty: 40 TABLET | Refills: 0 | Status: SHIPPED | OUTPATIENT
Start: 2024-04-20

## 2024-04-20 RX ORDER — DOCUSATE SODIUM 100 MG/1
100 CAPSULE, LIQUID FILLED ORAL 2 TIMES DAILY PRN
Qty: 20 CAPSULE | Refills: 0 | Status: SHIPPED | OUTPATIENT
Start: 2024-04-20

## 2024-04-20 RX ADMIN — ACETAMINOPHEN 650 MG: 325 TABLET, FILM COATED ORAL at 09:57

## 2024-04-20 RX ADMIN — ACETAMINOPHEN 650 MG: 325 TABLET, FILM COATED ORAL at 15:52

## 2024-04-20 RX ADMIN — IBUPROFEN 800 MG: 400 TABLET ORAL at 09:57

## 2024-04-20 RX ADMIN — ACETAMINOPHEN 650 MG: 325 TABLET, FILM COATED ORAL at 21:58

## 2024-04-20 RX ADMIN — ACETAMINOPHEN 650 MG: 325 TABLET, FILM COATED ORAL at 04:17

## 2024-04-20 RX ADMIN — IBUPROFEN 800 MG: 400 TABLET ORAL at 15:52

## 2024-04-20 RX ADMIN — IBUPROFEN 800 MG: 400 TABLET ORAL at 04:17

## 2024-04-20 RX ADMIN — IBUPROFEN 800 MG: 400 TABLET ORAL at 21:59

## 2024-04-20 RX ADMIN — DOCUSATE SODIUM 100 MG: 100 CAPSULE, LIQUID FILLED ORAL at 08:36

## 2024-04-20 ASSESSMENT — ACTIVITIES OF DAILY LIVING (ADL)
ADLS_ACUITY_SCORE: 20
ADLS_ACUITY_SCORE: 21
ADLS_ACUITY_SCORE: 20
ADLS_ACUITY_SCORE: 21
ADLS_ACUITY_SCORE: 20
ADLS_ACUITY_SCORE: 21
ADLS_ACUITY_SCORE: 21
ADLS_ACUITY_SCORE: 20
ADLS_ACUITY_SCORE: 21
ADLS_ACUITY_SCORE: 20
ADLS_ACUITY_SCORE: 20
ADLS_ACUITY_SCORE: 21
ADLS_ACUITY_SCORE: 20
ADLS_ACUITY_SCORE: 20
ADLS_ACUITY_SCORE: 21
ADLS_ACUITY_SCORE: 20
ADLS_ACUITY_SCORE: 21
ADLS_ACUITY_SCORE: 20
ADLS_ACUITY_SCORE: 21
ADLS_ACUITY_SCORE: 20
ADLS_ACUITY_SCORE: 20

## 2024-04-20 NOTE — PROGRESS NOTES
Post-partum Note      S: Patient is doing well today.  Pain is controlled with PO medications.  Tolerating regular diet without nausea or vomiting.  Ambulating without dizziness.  Urinating without difficulty. Lochia normal.  Breastfeeding.    O:   Patient Vitals for the past 24 hrs:   BP Temp Temp src Pulse Resp SpO2   24 0430 111/68 97.5  F (36.4  C) Oral 79 18 --   24 0045 118/72 97.6  F (36.4  C) Oral 75 16 --   24 113/71 97.8  F (36.6  C) Oral 90 16 --   24 1621 112/71 98.1  F (36.7  C) Oral 87 16 --   24 1330 110/66 98.3  F (36.8  C) Oral 74 16 --   24 1100 107/58 -- -- -- 16 98 %   24 1050 111/57 -- -- -- 16 98 %   24 1035 114/59 -- -- -- 16 97 %   24 1024 115/60 -- -- -- 16 97 %   24 1005 110/58 -- -- -- 16 97 %   24 0950 111/64 -- -- -- 16 97 %   24 0935 106/57 -- -- -- 16 94 %   24 0920 105/56 -- -- -- 16 97 %   24 0905 104/53 -- -- -- 16 98 %   24 0900 -- -- -- -- 16 --   24 0750 -- -- -- -- 20 --   24 0735 -- -- -- -- -- 95 %   24 0725 -- 98.7  F (37.1  C) Temporal 109 16 --   24 0624 135/67 98.9  F (37.2  C) Temporal -- -- --     Gen:  Resting comfortably, NAD  Pulm:  Breathing comfortably on room air  Abd:  Soft, appropriately ttp, non-distended.Fundus below umbilicus, firm and non-tender.  Ext:  non-tender, no bilateral LE edema    I/O last 3 completed shifts:  In: -   Out: 2379 [Urine:1800; Blood:579]    Hgb:   Hemoglobin   Date Value Ref Range Status   2024 12.1 11.7 - 15.7 g/dL Final       Assessment/Plan:  33 year old  on PPD #1 s/p .  1. Continue with routine postpartum management  2. Anxiety: plan 2 week follow-up. No medication.   3. Hgb: Pending this morning.   4. Rh: Positive  5. Feed: Breastfeeding  Dispo: DC home likely later today. Warning signs reviewed. Follow-up in 2 weeks with Dr. Dennis for mood check.    Marely Lim MD  [facility]  OB/GYN  4/20/2024, 6:23 AM

## 2024-04-20 NOTE — LACTATION NOTE
Lactation visit with Obdulia, significant other Darryl & baby girl Camille. Obdulia reports feeding is going well, does share her nipples are a little tender and she wonders if baby isn't latching deeply enough at times. Camille was feeding at time of visit, so we reviewed positioning, and checked latch. LC did noted lower lip was rolled in, so reviewed how to roll lower lip down and out to get a deeper latch and how to hold her breast for better breast support when baby starting a feeding. Obdulia felt latch was more comfortable after adjustment.    Discussed cluster feeding, what it is and when to expect it, The Second Night, satiety cues, feeding cues, and reviewed Feeding Log for home use. Encouraged to review Breastfeeding section in Your Guide to Postpartum & [facility].    Feeding plan: Recommend unlimited, frequent breast feedings: At least 8 - 12 times every 24 hours. Avoid pacifiers and supplementation with formula unless medically indicated. Encouraged use of feeding log and to record feedings, and void/stool patterns. Obdulia has a breast pump for home use. Reviewed outpatient lactation resources. Obdulia & Darryl very appreciative of visit.    Audelia Mitchell, RN, BSN, MNN, IBCLC

## 2024-04-20 NOTE — PLAN OF CARE
Vital signs stable and assessments WDL. Patient is up independently, voiding without difficulty, pain is controlled with tylenol and ibuprofen. Ice packs and tucks used for perineal comfort. Encouraged to continue to ambulate as able and to empty bladder frequently. Patient is bonding well with infant.

## 2024-04-20 NOTE — PLAN OF CARE
Goal Outcome Evaluation:      Plan of Care Reviewed With: patient, spouse    Overall Patient Progress: improvingOverall Patient Progress: improving     Vitals stable. Up ad tiana well. Voiding without difficulty. Pain controlled with tylenol and ibuprofen. Breastfeeding going well. Started pumping and supplementing after feedings this afternoon due to  low glucose. Depression screen and birth certificate completed. Pt has breast pump at home. Bonding well with .  supportive at bedside.

## 2024-04-20 NOTE — PLAN OF CARE
Goal Outcome Evaluation:      Plan of Care Reviewed With: patient, spouse    Overall Patient Progress: improvingOverall Patient Progress: improving     Vital signs are stable.  Pain well controlled, requesting prn pain medications as needed.  Up independently in room.  Breastfeeding is going well and is working on  cares. On pathway. Continue to monitor and notify MD as needed.

## 2024-04-20 NOTE — ANESTHESIA POSTPROCEDURE EVALUATION
Patient: Obdulia Crum    Procedure: * No procedures listed *       Anesthesia Type:  No value filed.    Note:  Disposition: Inpatient   Postop Pain Control: Uneventful            Sign Out: Well controlled pain   PONV: No   Neuro/Psych: Uneventful            Sign Out: Acceptable/Baseline neuro status   Airway/Respiratory: Uneventful            Sign Out: Acceptable/Baseline resp. status   CV/Hemodynamics: Uneventful            Sign Out: Acceptable CV status   Other NRE: NONE   DID A NON-ROUTINE EVENT OCCUR? No    Event details/Postop Comments:  Epidural has worn off without complications           Last vitals:  Vitals:    04/20/24 0045 04/20/24 0430 04/20/24 0807   BP: 118/72 111/68 110/70   Pulse: 75 79 87   Resp: 16 18 18   Temp: 36.4  C (97.6  F) 36.4  C (97.5  F) 36.8  C (98.2  F)   SpO2:          Electronically Signed By: Tayo Crawford MD  April 20, 2024  12:51 PM

## 2024-04-21 VITALS
BODY MASS INDEX: 26.69 KG/M2 | WEIGHT: 191.4 LBS | TEMPERATURE: 98.1 F | RESPIRATION RATE: 16 BRPM | DIASTOLIC BLOOD PRESSURE: 77 MMHG | OXYGEN SATURATION: 98 % | HEART RATE: 78 BPM | SYSTOLIC BLOOD PRESSURE: 115 MMHG

## 2024-04-21 PROCEDURE — 250N000013 HC RX MED GY IP 250 OP 250 PS 637: Performed by: OBSTETRICS & GYNECOLOGY

## 2024-04-21 RX ADMIN — ACETAMINOPHEN 650 MG: 325 TABLET, FILM COATED ORAL at 05:23

## 2024-04-21 RX ADMIN — DOCUSATE SODIUM 100 MG: 100 CAPSULE, LIQUID FILLED ORAL at 08:23

## 2024-04-21 RX ADMIN — IBUPROFEN 800 MG: 400 TABLET ORAL at 05:23

## 2024-04-21 ASSESSMENT — ACTIVITIES OF DAILY LIVING (ADL)
ADLS_ACUITY_SCORE: 20

## 2024-04-21 NOTE — PLAN OF CARE
Vital signs stable and postpartum assessments WDL. Patient is up independently, voiding without difficulty, pain is controlled with tylenol and ibuprofen. Tucks pads used for perineal comfort. Breastfeeding infant independently, pumping and supplementing infant with HDM via bottle after each breastfeed. Patient is encouraged to continue to ambulate as able and void frequently. Patient is bonding well with infant.  at bedside and supportive of patient and infant.

## 2024-04-21 NOTE — LACTATION NOTE
Follow up Lactation visit with Obdulia, significant other Darryl & baby girl Camille. Getting ready for discharge. Obdulia reports feeding is going much better, breastfeeding and then pumping and supplementing with donor milk. Baby is doing much better with bottle feeding as well. Encouraged pumping after feedings until baby no longer needs supplementing. Discussed supplementation would no longer be necessary once Zane milk is in and breastfeeding is well established. Reviewed to expect to need to bridge with donor milk or formula for a short time until mature milk volume increases. Since donor milk is not available through milk bank on , family plans to supplement with formula at discharge. Donor milk at Home information sheet given and process reviewed.    Discussed cluster feeding, what it is and when to expect it, The Second Night, satiety cues, feeding cues, and reviewed Feeding Log for home use. Encouraged to review Breastfeeding section in Your Guide to Postpartum & [facility].    Reviewed milk supply and engorgement. Reviewed typical timeline of milk supply initiation and progression over first 3-5 days postpartum. Discussed comfort measures for engorgement, plugged duct treatment, and warning signs of breast infection. Checked flange fit for pump and recommended using 20mm flange with her home pump and sizing down to 21mm with Medela pump.    Feeding plan: Recommend frequent breast feedings: At least 8 - 12 times every 24 hours. Supplement after feedings with expressed milk or formula. Pump with each feeding until baby no longer supplements. Encouraged use of feeding log and to record feedings, and void/stool patterns. Obdulia has a breast pump for home use. Follow up with [facility]. Reviewed outpatient lactation resources. Obdulia & Darryl very appreciative of visit.    Audelia Mitchell, RN, BSN, MNN, IBCLC

## 2024-04-21 NOTE — PROGRESS NOTES
OB Post-partum Note  PPD# 2    S:  Patient is doing well today. Pain is controlled with PO medications. Tolerating regular diet without nausea or vomiting. Ambulating without dizziness. Urinating without difficulty. Lochia normal. Breastfeeding, pumping and supplementing with donor milk/formula.     O:  /77   Pulse 78   Temp 98.1  F (36.7  C) (Oral)   Resp 16   Wt 86.8 kg (191 lb 6.4 oz)   LMP 2023   SpO2 98%   Breastfeeding Unknown   BMI 26.69 kg/m    Gen- A&O, NAD  Abd- Non-tender, fundus firm at umbilicus  Ext- non-tender, trace edema    Hemoglobin   Date Value Ref Range Status   2024 11.5 (L) 11.7 - 15.7 g/dL Final     AB POS  Rubella Immune    A/P: 33 year old  PPD# 2 s/p     1. Continue with routine postpartum management  2. Anxiety: plan 2 week follow-up. No medication.   3. Hgb: 11.5  4. Rh:Positive  5. Feed: Breastfeeding  Dispo:DC home today. Warning signs reviewed. Follow-up in 2 weeks with Dr. Dennis for mood check.    SATYA Adan CNM  2024  10:31 AM

## 2024-04-21 NOTE — PLAN OF CARE
Goal Outcome Evaluation:      Plan of Care Reviewed With: patient, spouse    Overall Patient Progress: improvingOverall Patient Progress: improving       Vitals stable. Up ad tiana well. Voiding without difficulty. Had BM. Pain controlled with tylenol and ibuprofen. Breastfeeding well and pumping/supplementing after feedings. Discharging home today with .

## 2024-04-21 NOTE — PLAN OF CARE
Mom and baby discharge instructions discussed with pt and spouse who verbalize understanding. Discharge prescriptions filled at discharge pharmacy and given to pt. Pt has breast pump at home. Mom and baby ID bands matched. Security alarms removed. Mom and baby ready for discharge home.

## 2024-04-21 NOTE — DISCHARGE INSTRUCTIONS
Warning Signs after Having a Baby    Keep this paper on your fridge or somewhere else where you can see it.    Call your provider if you have any of these symptoms up to 12 weeks after having your baby.    Thoughts of hurting yourself or your baby  Pain in your chest or trouble breathing  Severe headache not helped by pain medicine  Eyesight concerns (blurry vision, seeing spots or flashes of light, other changes to eyesight)  Fainting, shaking or other signs of a seizure    Call 9-1-1 if you feel that it is an emergency.     The symptoms below can happen to anyone after giving birth. They can be very serious. Call your provider if you have any of these warning signs.    My provider s phone number: _______________________    Losing too much blood (hemorrhage)    Call your provider if you soak through a pad in less than an hour or pass blood clots bigger than a golf ball. These may be signs that you are bleeding too much.    Blood clots in the legs or lungs    After you give birth, your body naturally clots its blood to help prevent blood loss. Sometimes this increased clotting can happen in other areas of the body, like the legs or lungs. This can block your blood flow and be very dangerous.     Call your provider if you:  Have a red, swollen spot on the back of your leg that is warm or painful when you touch it.   Are coughing up blood.     Infection    Call your provider if you have any of these symptoms:  Fever of 100.4 F (38 C) or higher.  Pain or redness around your stitches if you had an incision.   Any yellow, white, or green fluid coming from places where you had stitches or surgery.    Mood Problems (postpartum depression)    Many people feel sad or have mood changes after having a baby. But for some people, these mood swings are worse.     Call your provider right away if you feel so anxious or nervous that you can't care for yourself or your baby.    Preeclampsia (high blood pressure)    Even if you  "didn't have high blood pressure when you were pregnant, you are at risk for the high blood pressure disease called preeclampsia. This risk can last up to 12 weeks after giving birth.     Call your provider if you have:   Pain on your right side under your rib cage  Sudden swelling in the hands and face    Remember: You know your body. If something doesn't feel right, get medical help.     For informational purposes only. Not to replace the advice of your health care provider. Copyright 2020 [employer] [facility]. All rights reserved. Clinically reviewed by Tiffany Reynolds, RNC-OB, MSN. Yasuu [id number] - Rev .    Postpartum Care at Home With Your Baby: Care Instructions  Overview     After childbirth (postpartum period), your body goes through many changes as you recover. In these weeks after delivery, try to take good care of yourself. Get rest whenever you can and accept help from others.  It may take 4 to 6 weeks to feel like yourself again, and possibly longer if you had a  birth. You may feel sore or very tired as you recover. After delivery, you may continue to have contractions as the uterus returns to the size it was before your pregnancy. You will also have some vaginal bleeding. And you may have pain around the vagina as you heal. Several days after delivery you may also have pain and swelling in your breasts as they fill with milk. There are things you can do at home to help ease these discomforts.  After childbirth, it's common to feel emotional. You may feel irritable, cry easily, and feel happy one minute and sad the next. This is called the \"baby blues.\" Hormone changes are one cause of these emotional changes. These feelings usually get better within a couple of weeks. If they don't, talk to your doctor or midwife.  In the first couple of weeks after you give birth, your doctor or midwife may want to check in with you and make a plan for follow-up care. You will likely have a " complete postpartum visit in the first 3 months after delivery. At that time, your doctor or midwife will check on your recovery and see how you're doing. But if you have questions or concerns before then, you can always call your doctor or midwife.  Follow-up care is a key part of your treatment and safety. Be sure to make and go to all appointments, and call your doctor if you are having problems. It's also a good idea to know your test results and keep a list of the medicines you take.  How can you care for yourself at home?  Taking care of your body  Use pads instead of tampons for bleeding. After birth, you will have bloody vaginal discharge. You may also pass some blood clots that shouldn't be bigger than an egg. Over the next 6 weeks or so, your bleeding should decrease a little every day and slowly change to a pinkish and then whitish discharge.  For cramps or mild pain, try an over-the-counter pain medicine, such as acetaminophen (Tylenol) or ibuprofen (Advil, Motrin). Read and follow all instructions on the label.  To ease pain around the vagina or from hemorrhoids:  Put ice or a cold pack on the area for 10 to 20 minutes at a time. Put a thin cloth between the ice and your skin.  Try sitting in a few inches of warm water (sitz bath) when you can or after bowel movements.  Clean yourself with a gentle squeeze of warm water from a bottle instead of wiping with toilet paper.  Use witch hazel or hemorrhoid pads (such as Tucks).  Try using a cold compress for sore and swollen breasts. And wear a supportive bra that fits.  Ease constipation by drinking plenty of fluids and eating high-fiber foods. Ask your doctor or midwife about over-the-counter stool softeners.  Activity  Rest when you can.  Ask for help from family or friends when you need it.  If you can, have another adult in your home for at least 2 or 3 days after birth.  When you feel ready, try to get some exercise every day. For many people, walking  is a good choice. Don't do any heavy exercise until your doctor or midwife says it's okay.  Ask your doctor or midwife when it is okay to have vaginal sex.  If you don't want to get pregnant, talk to your doctor or midwife about birth control options. You can get pregnant even before your period returns. Also, you can get pregnant while you are breastfeeding.  Talk to your doctor or midwife if you want to get pregnant again. They can talk to you about when it is safe.  Emotional health  It's normal to have some sadness, anxiety, and mood swings after delivery. It may help to talk with a trusted friend or family member. You can also call the Maternal Mental Health Hotline at 9-796-[facility] (1-642.218.1452) for support. If these mood changes last more than a couple of weeks, talk to your doctor or midwife.  When should you call for help?  Share this information with your partner, family, or a friend. They can help you watch for warning signs.  Call 911  anytime you think you may need emergency care. For example, call if:    You feel you cannot stop from hurting yourself, your baby, or someone else.     You passed out (lost consciousness).     You have chest pain, are short of breath, or cough up blood.     You have a seizure.   Where to get help 24 hours a day, 7 days a week   If you or someone you know talks about suicide, self-harm, a mental health crisis, a substance use crisis, or any other kind of emotional distress, get help right away. You can:    Call the Suicide and Crisis Lifeline at 628.     Call 4-463-230-TALK (1-673.403.3921).     Text HOME to 368040 to access the Crisis Text Line.   Consider saving these numbers in your phone.  Go to [employer] for more information or to chat online.  Call your doctor or midwife now or seek immediate medical care if:    You have signs of hemorrhage (too much bleeding), such as:  Heavy vaginal bleeding. This means that you are soaking through one or more pads in an  "hour. Or you pass blood clots bigger than an egg.  Feeling dizzy or lightheaded, or you feel like you may faint.  Feeling so tired or weak that you cannot do your usual activities.  A fast or irregular heartbeat.  New or worse belly pain.     You have signs of infection, such as:  A fever.  Increased pain, swelling, warmth, or redness from an incision or wound.  Frequent or painful urination or blood in your urine.  Vaginal discharge that smells bad.  New or worse belly pain.     You have symptoms of a blood clot in your leg (called a deep vein thrombosis), such as:  Pain in the calf, back of the knee, thigh, or groin.  Swelling in the leg or groin.  A color change on the leg or groin. The skin may be reddish or purplish, depending on your usual skin color.     You have signs of preeclampsia, such as:  Sudden swelling of your face, hands, or feet.  New vision problems (such as dimness, blurring, or seeing spots).  A severe headache.     You have signs of heart failure, such as:  New or increased shortness of breath.  New or worse swelling in your legs, ankles, or feet.  Sudden weight gain, such as more than 2 to 3 pounds in a day or 5 pounds in a week.  Feeling so tired or weak that you cannot do your usual activities.     You had spinal or epidural pain relief and have:  New or worse back pain.  Increased pain, swelling, warmth, or redness at the injection site.  Tingling, weakness, or numbness in your legs or groin.   Watch closely for changes in your health, and be sure to contact your doctor or midwife if:    Your vaginal bleeding isn't decreasing.     You feel sad, anxious, or hopeless for more than a few days.     You are having problems with your breasts or breastfeeding.   Where can you learn more?  Go to https://www.healthwise.net/patiented  Enter Z768 in the search box to learn more about \"Postpartum Care at Home With Your Baby: Care Instructions.\"  Current as of: July 10, 2023               Content " Version: 14.0    6484-6112 [employer].   Care instructions adapted under license by your healthcare professional. If you have questions about a medical condition or this instruction, always ask your healthcare professional. [employer] disclaims any warranty or liability for your use of this information.

## 2024-06-01 ENCOUNTER — HEALTH MAINTENANCE LETTER (OUTPATIENT)
Age: 34
End: 2024-06-01

## 2024-06-27 ENCOUNTER — LAB REQUISITION (OUTPATIENT)
Dept: LAB | Facility: CLINIC | Age: 34
End: 2024-06-27

## 2024-06-27 DIAGNOSIS — Z01.419 ENCOUNTER FOR GYNECOLOGICAL EXAMINATION (GENERAL) (ROUTINE) WITHOUT ABNORMAL FINDINGS: ICD-10-CM

## 2024-06-27 PROCEDURE — 87624 HPV HI-RISK TYP POOLED RSLT: CPT | Performed by: OBSTETRICS & GYNECOLOGY

## 2024-06-27 PROCEDURE — G0145 SCR C/V CYTO,THINLAYER,RESCR: HCPCS | Performed by: OBSTETRICS & GYNECOLOGY

## 2024-06-28 LAB
HPV HR 12 DNA CVX QL NAA+PROBE: NEGATIVE
HPV16 DNA CVX QL NAA+PROBE: NEGATIVE
HPV18 DNA CVX QL NAA+PROBE: NEGATIVE
HUMAN PAPILLOMA VIRUS FINAL DIAGNOSIS: NORMAL

## 2024-07-03 LAB
BKR LAB AP GYN ADEQUACY: NORMAL
BKR LAB AP GYN INTERPRETATION: NORMAL
PATH REPORT.COMMENTS IMP SPEC: NORMAL
PATH REPORT.COMMENTS IMP SPEC: NORMAL

## 2025-06-14 ENCOUNTER — HEALTH MAINTENANCE LETTER (OUTPATIENT)
Age: 35
End: 2025-06-14